# Patient Record
Sex: FEMALE | Race: ASIAN | NOT HISPANIC OR LATINO | URBAN - METROPOLITAN AREA
[De-identification: names, ages, dates, MRNs, and addresses within clinical notes are randomized per-mention and may not be internally consistent; named-entity substitution may affect disease eponyms.]

---

## 2022-05-15 ENCOUNTER — INPATIENT (INPATIENT)
Facility: HOSPITAL | Age: 52
LOS: 4 days | Discharge: HOME | DRG: 155 | End: 2022-05-20
Attending: INTERNAL MEDICINE | Admitting: INTERNAL MEDICINE
Payer: MEDICAID

## 2022-05-15 VITALS
SYSTOLIC BLOOD PRESSURE: 180 MMHG | HEIGHT: 66 IN | WEIGHT: 164.91 LBS | OXYGEN SATURATION: 100 % | RESPIRATION RATE: 18 BRPM | TEMPERATURE: 98 F | HEART RATE: 114 BPM | DIASTOLIC BLOOD PRESSURE: 90 MMHG

## 2022-05-15 DIAGNOSIS — K11.21 ACUTE SIALOADENITIS: ICD-10-CM

## 2022-05-15 LAB
ALBUMIN SERPL ELPH-MCNC: 5.2 G/DL — HIGH (ref 3.3–5)
ALP SERPL-CCNC: 93 U/L — SIGNIFICANT CHANGE UP (ref 40–120)
ALT FLD-CCNC: 47 U/L — HIGH (ref 10–45)
ANION GAP SERPL CALC-SCNC: 21 MMOL/L — HIGH (ref 5–17)
APPEARANCE UR: CLEAR — SIGNIFICANT CHANGE UP
APTT BLD: 27.6 SEC — SIGNIFICANT CHANGE UP (ref 27.5–35.5)
AST SERPL-CCNC: 36 U/L — SIGNIFICANT CHANGE UP (ref 10–40)
BACTERIA # UR AUTO: NEGATIVE — SIGNIFICANT CHANGE UP
BASE EXCESS BLDV CALC-SCNC: -2.3 MMOL/L — LOW (ref -2–2)
BASE EXCESS BLDV CALC-SCNC: -2.6 MMOL/L — LOW (ref -2–2)
BASOPHILS # BLD AUTO: 0.03 K/UL — SIGNIFICANT CHANGE UP (ref 0–0.2)
BASOPHILS NFR BLD AUTO: 0.3 % — SIGNIFICANT CHANGE UP (ref 0–2)
BILIRUB SERPL-MCNC: 0.8 MG/DL — SIGNIFICANT CHANGE UP (ref 0.2–1.2)
BILIRUB UR-MCNC: NEGATIVE — SIGNIFICANT CHANGE UP
BUN SERPL-MCNC: 13 MG/DL — SIGNIFICANT CHANGE UP (ref 7–23)
CA-I SERPL-SCNC: 1.18 MMOL/L — SIGNIFICANT CHANGE UP (ref 1.15–1.33)
CA-I SERPL-SCNC: 1.28 MMOL/L — SIGNIFICANT CHANGE UP (ref 1.15–1.33)
CALCIUM SERPL-MCNC: 10.2 MG/DL — SIGNIFICANT CHANGE UP (ref 8.4–10.5)
CHLORIDE BLDV-SCNC: 101 MMOL/L — SIGNIFICANT CHANGE UP (ref 96–108)
CHLORIDE BLDV-SCNC: 107 MMOL/L — SIGNIFICANT CHANGE UP (ref 96–108)
CHLORIDE SERPL-SCNC: 100 MMOL/L — SIGNIFICANT CHANGE UP (ref 96–108)
CO2 BLDV-SCNC: 24 MMOL/L — SIGNIFICANT CHANGE UP (ref 22–26)
CO2 BLDV-SCNC: 25 MMOL/L — SIGNIFICANT CHANGE UP (ref 22–26)
CO2 SERPL-SCNC: 18 MMOL/L — LOW (ref 22–31)
COLOR SPEC: COLORLESS — SIGNIFICANT CHANGE UP
CREAT SERPL-MCNC: 0.5 MG/DL — SIGNIFICANT CHANGE UP (ref 0.5–1.3)
CRP SERPL-MCNC: 6 MG/L — HIGH (ref 0–4)
DIFF PNL FLD: NEGATIVE — SIGNIFICANT CHANGE UP
EGFR: 113 ML/MIN/1.73M2 — SIGNIFICANT CHANGE UP
EOSINOPHIL # BLD AUTO: 0.08 K/UL — SIGNIFICANT CHANGE UP (ref 0–0.5)
EOSINOPHIL NFR BLD AUTO: 0.8 % — SIGNIFICANT CHANGE UP (ref 0–6)
EPI CELLS # UR: 3 /HPF — SIGNIFICANT CHANGE UP
GAS PNL BLDV: 139 MMOL/L — SIGNIFICANT CHANGE UP (ref 136–145)
GAS PNL BLDV: 140 MMOL/L — SIGNIFICANT CHANGE UP (ref 136–145)
GAS PNL BLDV: SIGNIFICANT CHANGE UP
GLUCOSE BLDC GLUCOMTR-MCNC: 268 MG/DL — HIGH (ref 70–99)
GLUCOSE BLDC GLUCOMTR-MCNC: 330 MG/DL — HIGH (ref 70–99)
GLUCOSE BLDV-MCNC: 109 MG/DL — HIGH (ref 70–99)
GLUCOSE BLDV-MCNC: 203 MG/DL — HIGH (ref 70–99)
GLUCOSE SERPL-MCNC: 202 MG/DL — HIGH (ref 70–99)
GLUCOSE UR QL: NEGATIVE — SIGNIFICANT CHANGE UP
HCO3 BLDV-SCNC: 23 MMOL/L — SIGNIFICANT CHANGE UP (ref 22–29)
HCO3 BLDV-SCNC: 24 MMOL/L — SIGNIFICANT CHANGE UP (ref 22–29)
HCT VFR BLD CALC: 39.1 % — SIGNIFICANT CHANGE UP (ref 34.5–45)
HCT VFR BLDA CALC: 33 % — LOW (ref 34.5–46.5)
HCT VFR BLDA CALC: 40 % — SIGNIFICANT CHANGE UP (ref 34.5–46.5)
HGB BLD CALC-MCNC: 10.9 G/DL — LOW (ref 11.7–16.1)
HGB BLD CALC-MCNC: 13.3 G/DL — SIGNIFICANT CHANGE UP (ref 11.7–16.1)
HGB BLD-MCNC: 12.5 G/DL — SIGNIFICANT CHANGE UP (ref 11.5–15.5)
HYALINE CASTS # UR AUTO: 0 /LPF — SIGNIFICANT CHANGE UP (ref 0–2)
IMM GRANULOCYTES NFR BLD AUTO: 0.7 % — SIGNIFICANT CHANGE UP (ref 0–1.5)
INR BLD: 1.03 RATIO — SIGNIFICANT CHANGE UP (ref 0.88–1.16)
KETONES UR-MCNC: NEGATIVE — SIGNIFICANT CHANGE UP
LACTATE BLDV-MCNC: 2.7 MMOL/L — HIGH (ref 0.7–2)
LACTATE BLDV-MCNC: 6.4 MMOL/L — CRITICAL HIGH (ref 0.7–2)
LEUKOCYTE ESTERASE UR-ACNC: ABNORMAL
LYMPHOCYTES # BLD AUTO: 2.58 K/UL — SIGNIFICANT CHANGE UP (ref 1–3.3)
LYMPHOCYTES # BLD AUTO: 27.1 % — SIGNIFICANT CHANGE UP (ref 13–44)
MCHC RBC-ENTMCNC: 29.8 PG — SIGNIFICANT CHANGE UP (ref 27–34)
MCHC RBC-ENTMCNC: 32 GM/DL — SIGNIFICANT CHANGE UP (ref 32–36)
MCV RBC AUTO: 93.1 FL — SIGNIFICANT CHANGE UP (ref 80–100)
MONOCYTES # BLD AUTO: 0.44 K/UL — SIGNIFICANT CHANGE UP (ref 0–0.9)
MONOCYTES NFR BLD AUTO: 4.6 % — SIGNIFICANT CHANGE UP (ref 2–14)
MRSA PCR RESULT.: SIGNIFICANT CHANGE UP
MUV AB SER-ACNC: POSITIVE
MUV IGG FLD-ACNC: 56.9 AU/ML — SIGNIFICANT CHANGE UP
NEUTROPHILS # BLD AUTO: 6.32 K/UL — SIGNIFICANT CHANGE UP (ref 1.8–7.4)
NEUTROPHILS NFR BLD AUTO: 66.5 % — SIGNIFICANT CHANGE UP (ref 43–77)
NITRITE UR-MCNC: NEGATIVE — SIGNIFICANT CHANGE UP
NRBC # BLD: 0 /100 WBCS — SIGNIFICANT CHANGE UP (ref 0–0)
PCO2 BLDV: 43 MMHG — HIGH (ref 39–42)
PCO2 BLDV: 45 MMHG — HIGH (ref 39–42)
PH BLDV: 7.33 — SIGNIFICANT CHANGE UP (ref 7.32–7.43)
PH BLDV: 7.34 — SIGNIFICANT CHANGE UP (ref 7.32–7.43)
PH UR: 6 — SIGNIFICANT CHANGE UP (ref 5–8)
PLATELET # BLD AUTO: 159 K/UL — SIGNIFICANT CHANGE UP (ref 150–400)
PO2 BLDV: 48 MMHG — HIGH (ref 25–45)
PO2 BLDV: 63 MMHG — HIGH (ref 25–45)
POTASSIUM BLDV-SCNC: 3.7 MMOL/L — SIGNIFICANT CHANGE UP (ref 3.5–5.1)
POTASSIUM BLDV-SCNC: 4.1 MMOL/L — SIGNIFICANT CHANGE UP (ref 3.5–5.1)
POTASSIUM SERPL-MCNC: 3.9 MMOL/L — SIGNIFICANT CHANGE UP (ref 3.5–5.3)
POTASSIUM SERPL-SCNC: 3.9 MMOL/L — SIGNIFICANT CHANGE UP (ref 3.5–5.3)
PROCALCITONIN SERPL-MCNC: 0.06 NG/ML — SIGNIFICANT CHANGE UP (ref 0.02–0.1)
PROT SERPL-MCNC: 8.4 G/DL — HIGH (ref 6–8.3)
PROT UR-MCNC: NEGATIVE — SIGNIFICANT CHANGE UP
PROTHROM AB SERPL-ACNC: 11.9 SEC — SIGNIFICANT CHANGE UP (ref 10.5–13.4)
RBC # BLD: 4.2 M/UL — SIGNIFICANT CHANGE UP (ref 3.8–5.2)
RBC # FLD: 13.2 % — SIGNIFICANT CHANGE UP (ref 10.3–14.5)
RBC CASTS # UR COMP ASSIST: 1 /HPF — SIGNIFICANT CHANGE UP (ref 0–4)
S AUREUS DNA NOSE QL NAA+PROBE: SIGNIFICANT CHANGE UP
SAO2 % BLDV: 80.2 % — SIGNIFICANT CHANGE UP (ref 67–88)
SAO2 % BLDV: 91.6 % — HIGH (ref 67–88)
SARS-COV-2 RNA SPEC QL NAA+PROBE: SIGNIFICANT CHANGE UP
SODIUM SERPL-SCNC: 139 MMOL/L — SIGNIFICANT CHANGE UP (ref 135–145)
SP GR SPEC: 1.04 — HIGH (ref 1.01–1.02)
UROBILINOGEN FLD QL: NEGATIVE — SIGNIFICANT CHANGE UP
WBC # BLD: 9.52 K/UL — SIGNIFICANT CHANGE UP (ref 3.8–10.5)
WBC # FLD AUTO: 9.52 K/UL — SIGNIFICANT CHANGE UP (ref 3.8–10.5)
WBC UR QL: 14 /HPF — HIGH (ref 0–5)

## 2022-05-15 PROCEDURE — 71045 X-RAY EXAM CHEST 1 VIEW: CPT | Mod: 26

## 2022-05-15 PROCEDURE — 70491 CT SOFT TISSUE NECK W/DYE: CPT | Mod: 26,MA

## 2022-05-15 PROCEDURE — 99291 CRITICAL CARE FIRST HOUR: CPT

## 2022-05-15 RX ORDER — SODIUM CHLORIDE 9 MG/ML
1000 INJECTION, SOLUTION INTRAVENOUS
Refills: 0 | Status: DISCONTINUED | OUTPATIENT
Start: 2022-05-15 | End: 2022-05-20

## 2022-05-15 RX ORDER — ACETAMINOPHEN 500 MG
1000 TABLET ORAL EVERY 6 HOURS
Refills: 0 | Status: DISCONTINUED | OUTPATIENT
Start: 2022-05-15 | End: 2022-05-20

## 2022-05-15 RX ORDER — BENZOCAINE AND MENTHOL 5; 1 G/100ML; G/100ML
1 LIQUID ORAL
Refills: 0 | Status: DISCONTINUED | OUTPATIENT
Start: 2022-05-15 | End: 2022-05-20

## 2022-05-15 RX ORDER — DEXTROSE 50 % IN WATER 50 %
25 SYRINGE (ML) INTRAVENOUS ONCE
Refills: 0 | Status: DISCONTINUED | OUTPATIENT
Start: 2022-05-15 | End: 2022-05-20

## 2022-05-15 RX ORDER — INSULIN LISPRO 100/ML
VIAL (ML) SUBCUTANEOUS
Refills: 0 | Status: DISCONTINUED | OUTPATIENT
Start: 2022-05-15 | End: 2022-05-20

## 2022-05-15 RX ORDER — SODIUM CHLORIDE 9 MG/ML
1000 INJECTION INTRAMUSCULAR; INTRAVENOUS; SUBCUTANEOUS ONCE
Refills: 0 | Status: DISCONTINUED | OUTPATIENT
Start: 2022-05-15 | End: 2022-05-15

## 2022-05-15 RX ORDER — GLUCAGON INJECTION, SOLUTION 0.5 MG/.1ML
1 INJECTION, SOLUTION SUBCUTANEOUS ONCE
Refills: 0 | Status: DISCONTINUED | OUTPATIENT
Start: 2022-05-15 | End: 2022-05-20

## 2022-05-15 RX ORDER — AMPICILLIN SODIUM AND SULBACTAM SODIUM 250; 125 MG/ML; MG/ML
3 INJECTION, POWDER, FOR SUSPENSION INTRAMUSCULAR; INTRAVENOUS EVERY 6 HOURS
Refills: 0 | Status: DISCONTINUED | OUTPATIENT
Start: 2022-05-15 | End: 2022-05-17

## 2022-05-15 RX ORDER — SODIUM CHLORIDE 9 MG/ML
1000 INJECTION INTRAMUSCULAR; INTRAVENOUS; SUBCUTANEOUS ONCE
Refills: 0 | Status: COMPLETED | OUTPATIENT
Start: 2022-05-15 | End: 2022-05-15

## 2022-05-15 RX ORDER — IBUPROFEN 200 MG
600 TABLET ORAL EVERY 6 HOURS
Refills: 0 | Status: DISCONTINUED | OUTPATIENT
Start: 2022-05-15 | End: 2022-05-20

## 2022-05-15 RX ORDER — PIPERACILLIN AND TAZOBACTAM 4; .5 G/20ML; G/20ML
3.38 INJECTION, POWDER, LYOPHILIZED, FOR SOLUTION INTRAVENOUS ONCE
Refills: 0 | Status: COMPLETED | OUTPATIENT
Start: 2022-05-15 | End: 2022-05-15

## 2022-05-15 RX ORDER — ACETAMINOPHEN 500 MG
650 TABLET ORAL ONCE
Refills: 0 | Status: COMPLETED | OUTPATIENT
Start: 2022-05-15 | End: 2022-05-15

## 2022-05-15 RX ORDER — DEXAMETHASONE 0.5 MG/5ML
8 ELIXIR ORAL EVERY 8 HOURS
Refills: 0 | Status: DISCONTINUED | OUTPATIENT
Start: 2022-05-15 | End: 2022-05-17

## 2022-05-15 RX ORDER — DEXTROSE 50 % IN WATER 50 %
15 SYRINGE (ML) INTRAVENOUS ONCE
Refills: 0 | Status: DISCONTINUED | OUTPATIENT
Start: 2022-05-15 | End: 2022-05-20

## 2022-05-15 RX ORDER — INSULIN LISPRO 100/ML
VIAL (ML) SUBCUTANEOUS AT BEDTIME
Refills: 0 | Status: DISCONTINUED | OUTPATIENT
Start: 2022-05-15 | End: 2022-05-20

## 2022-05-15 RX ORDER — DEXTROSE 50 % IN WATER 50 %
12.5 SYRINGE (ML) INTRAVENOUS ONCE
Refills: 0 | Status: DISCONTINUED | OUTPATIENT
Start: 2022-05-15 | End: 2022-05-20

## 2022-05-15 RX ORDER — DEXAMETHASONE 0.5 MG/5ML
6 ELIXIR ORAL EVERY 8 HOURS
Refills: 0 | Status: DISCONTINUED | OUTPATIENT
Start: 2022-05-15 | End: 2022-05-15

## 2022-05-15 RX ORDER — SODIUM CHLORIDE 9 MG/ML
2000 INJECTION INTRAMUSCULAR; INTRAVENOUS; SUBCUTANEOUS ONCE
Refills: 0 | Status: COMPLETED | OUTPATIENT
Start: 2022-05-15 | End: 2022-05-15

## 2022-05-15 RX ADMIN — AMPICILLIN SODIUM AND SULBACTAM SODIUM 200 GRAM(S): 250; 125 INJECTION, POWDER, FOR SUSPENSION INTRAMUSCULAR; INTRAVENOUS at 22:01

## 2022-05-15 RX ADMIN — SODIUM CHLORIDE 2000 MILLILITER(S): 9 INJECTION INTRAMUSCULAR; INTRAVENOUS; SUBCUTANEOUS at 07:57

## 2022-05-15 RX ADMIN — Medication 3: at 20:21

## 2022-05-15 RX ADMIN — SODIUM CHLORIDE 1000 MILLILITER(S): 9 INJECTION INTRAMUSCULAR; INTRAVENOUS; SUBCUTANEOUS at 22:51

## 2022-05-15 RX ADMIN — PIPERACILLIN AND TAZOBACTAM 200 GRAM(S): 4; .5 INJECTION, POWDER, LYOPHILIZED, FOR SOLUTION INTRAVENOUS at 09:01

## 2022-05-15 RX ADMIN — Medication 2: at 22:51

## 2022-05-15 RX ADMIN — Medication 101.6 MILLIGRAM(S): at 21:25

## 2022-05-15 RX ADMIN — Medication 6 MILLIGRAM(S): at 13:24

## 2022-05-15 RX ADMIN — Medication 650 MILLIGRAM(S): at 07:58

## 2022-05-15 RX ADMIN — AMPICILLIN SODIUM AND SULBACTAM SODIUM 200 GRAM(S): 250; 125 INJECTION, POWDER, FOR SUSPENSION INTRAMUSCULAR; INTRAVENOUS at 16:30

## 2022-05-15 NOTE — ED CDU PROVIDER INITIAL DAY NOTE - DETAILS
Sialosis / Parotiditis  - IV Unasyn (confirmed w/ ENT)  - IV Decadron 8mg q8  - Pain ctrl  - ENT Following

## 2022-05-15 NOTE — ED ADULT NURSE REASSESSMENT NOTE - NS ED NURSE REASSESS COMMENT FT1
MD Sanz aware that patient is febrile. [facility]  used, ID # [id number] Trent. Patient updated on plan of care. Breathing spontaneous and unlabored on room air. Skin warm hot and of color appropriate for ethnicity.  at bedside. Comfort and safety measures in place.

## 2022-05-15 NOTE — ED CDU PROVIDER INITIAL DAY NOTE - NS ED ATTENDING STATEMENT MOD
This was a shared visit with the BLAIR. I reviewed and verified the documentation and independently performed the documented:

## 2022-05-15 NOTE — CONSULT NOTE ADULT - SUBJECTIVE AND OBJECTIVE BOX
CC: b/l neck swelling     HPI:  53 yo F pmhx htn dm hypothyroid p/w 3 months of b/l facial swelling. Started 3 months ago no inciting incident or trauma. intermittent worsening swelling and pain. seen by pcp given course of amoxacillin for 1 month with no improvement. Pt is able to tolerate po intake without any dysphagia, odynophagia. intermittent fevers with fatigue. no weight loss or night sweats. no sick contacts. Denies CP sob abd pain n/v dysuria.    PAST MEDICAL & SURGICAL HISTORY:    Allergies    No Known Allergies    Intolerances      MEDICATIONS  (STANDING):  dexAMETHasone     Tablet 6 milliGRAM(s) Oral every 8 hours    MEDICATIONS  (PRN):      Social History: no tobacco, no etoh     Family history: Pt denies any sign FHx    ROS:   ENT: all negative except as noted in HPI   CV: denies palpitations  Pulm: denies SOB, cough, hemoptysis  GI: denies change in apetite, indigestion, n/v  : denies pertinent urinary symptoms, urgency  Neuro: denies numbness/tingling, loss of sensation  Psych: denies anxiety  MS: denies muscle weakness, instability  Heme: denies easy bruising or bleeding  Endo: denies heat/cold intolerance, excessive sweating  Vascular: denies LE edema    Vital Signs Last 24 Hrs  T(C): 36.7 (15 May 2022 13:19), Max: 38.2 (15 May 2022 07:40)  T(F): 98.1 (15 May 2022 13:19), Max: 100.8 (15 May 2022 07:40)  HR: 84 (15 May 2022 13:19) (84 - 114)  BP: 111/81 (15 May 2022 13:19) (111/81 - 180/90)  BP(mean): --  RR: 18 (15 May 2022 13:19) (18 - 20)  SpO2: 97% (15 May 2022 13:19) (96% - 100%)                          12.5   9.52  )-----------( 159      ( 15 May 2022 08:07 )             39.1    05-15    139  |  100  |  13  ----------------------------<  202<H>  3.9   |  18<L>  |  0.50    Ca    10.2      15 May 2022 08:07    TPro  8.4<H>  /  Alb  5.2<H>  /  TBili  0.8  /  DBili  x   /  AST  36  /  ALT  47<H>  /  AlkPhos  93  05-15   PT/INR - ( 15 May 2022 08:07 )   PT: 11.9 sec;   INR: 1.03 ratio         PTT - ( 15 May 2022 08:07 )  PTT:27.6 sec    PHYSICAL EXAM:  Gen: NAD  Skin: No rashes, bruises, or lesions  Head: Normocephalic, Atraumatic  Face: no edema, erythema, or fluctuance. Parotid glands soft without mass  Eyes: no scleral injection  Nose: Nares bilaterally patent, no discharge  Mouth: No Stridor / Drooling / Trismus.  Mucosa moist, tongue/uvula midline, oropharynx clear  Neck: b/l parotid swelling, b/l submandibular swelling +TTP L<R, supple,  trachea midline  Lymphatic: + lymphadenopathy  Resp: breathing easily, no stridor  CV: no peripheral edema/cyanosis  GI: nondistended   Peripheral vascular: no JVD or edema  Neuro: facial nerve intact, no facial droop              IMAGING/ADDITIONAL STUDIES: < from: CT Neck Soft Tissue w/ IV Cont (05.15.22 @ 09:32) >  IMPRESSION:  Symmetric enlargement of the BILATERAL parotid glands which fatty   infiltration consistent with sialosis    < end of copied text >

## 2022-05-15 NOTE — ED CDU PROVIDER INITIAL DAY NOTE - PROGRESS NOTE DETAILS
CDU PROGRESS NOTE AMANDA SANTIAGO: Krupa  #[id number] Romero. Received pt at 1900 sign-out. Pt resting in stretcher in NAD. VSS. Pt ate dinner. Ambulatory around unit with steady gait. S1 S2 noted, RRR, lungs CTA b/l, BS x4 with soft, nontender abdomen. Pt without complaints. Will continue to monitor overnight. CDU PROGRESS NOTE AMANDA SANTIAGO: Krupa  #[id number] Romero. Received pt at 1900 sign-out. Pt resting in stretcher in NAD. VSS. Pt is aox3, speaking coherently, No Stridor / Drooling / Trismus.  Mucosa moist, tongue/uvula midline, oropharynx clear Neck: b/l parotid swelling, b/l submandibular swelling +TTP L<R, supple,  trachea midline. Lymphatic: + lymphadenopathy. Pt without complaints. Will continue to monitor overnight. ENT recs appreciated.

## 2022-05-15 NOTE — ED ADULT NURSE REASSESSMENT NOTE - NS ED NURSE REASSESS COMMENT FT1
"Oncology Rooming Note    December 21, 2017 9:53 AM   Ashvin Tiwari is a 64 year old female who presents for:    Chief Complaint   Patient presents with     Port Draw     port accessed and labs drawn by rn.  vs taken.     Oncology Clinic Visit     Return for Neoplasm of Cervix     Initial Vitals: /88 (BP Location: Left arm, Patient Position: Sitting, Cuff Size: Adult Large)  Pulse 110  Temp 99  F (37.2  C) (Oral)  Resp 16  Wt 80.7 kg (177 lb 14.4 oz)  SpO2 96%  BMI 31.51 kg/m2 Estimated body mass index is 31.51 kg/(m^2) as calculated from the following:    Height as of 12/19/17: 1.6 m (5' 3\").    Weight as of this encounter: 80.7 kg (177 lb 14.4 oz). Body surface area is 1.89 meters squared.  Severe Pain (7) Comment: right   No LMP recorded. Patient is postmenopausal.  Allergies reviewed: Yes  Medications reviewed: Yes    Medications: Medication refills not needed today.  Pharmacy name entered into EPIC:    [facility] (USE ONLY AT [facility]) - [place], [place] - [address] [id number] - [place], [place] - [address] [facility] [address] [facility]    Clinical concerns: results, BP still high, per patient   Ruth was notified.    6 minutes for nursing intake (face to face time)     Monica Madrigal MA              " PER NURSING PT AWAITING BED AT [facility] FOR ERCP WITH POSSIBLE DRAIN. CM TO SEE IF PLAN CHANGES. Pt received from MICA Price. Pt oriented to CDU & plan of care was discussed. Pt denies any pain and difficulty breathing or swallowing. B/l neck swelling noted. Pt speaking in full coherent sentences. Pt able to tolerate dinner without any difficulty. Pt aware to notify RN or PA for any difficulty breathing or swallowing. Safety & comfort measures maintained. Call bell in reach. Will continue to monitor.

## 2022-05-15 NOTE — ED CDU PROVIDER DISPOSITION NOTE - CLINICAL COURSE
French  Romero [id number] (Pt accompanied by )  	51 yo F pmhx htn dm hypothyroid p/w 3 months of b/l facial swelling. Started 3 months ago no inciting incident or trauma. intermittent worsening swelling and pain. seen by pcp given course of amoxacillin for 1 month with no improvement. Pt is able to tolerate po intake. intermittent fevers with fatigue. no weight loss or night sweats. no sob or difficulty swallowing. no sick contacts. Denies CP abd pain n/v dysuria.  In ED, initial lactate 6.4 improved to 2.7 after 2L fluid bolus, labs otherwise non-actionable. HR normalized, VSS. CT neck shows sialosis. +Entero/rhinovirus. ENT consulted, Mumps r/o, rec IV decadron and Unasyn for now. CDU for steroids, abx, pain ctrl, ENT following.  In CDU, Sami  Olesyarodsima [id number] (Pt accompanied by )  	53 yo F pmhx htn dm hypothyroid p/w 3 months of b/l facial swelling. Started 3 months ago no inciting incident or trauma. intermittent worsening swelling and pain. seen by pcp given course of amoxacillin for 1 month with no improvement. Pt is able to tolerate po intake. intermittent fevers with fatigue. no weight loss or night sweats. no sob or difficulty swallowing. no sick contacts. Denies CP abd pain n/v dysuria.  In ED, initial lactate 6.4 improved to 2.7 after 2L fluid bolus, labs otherwise non-actionable. HR normalized, VSS. CT neck shows sialosis. +Entero/rhinovirus. ENT consulted, Mumps r/o, rec IV decadron and Unasyn for now. CDU for steroids, abx, pain ctrl, ENT following.  In CDU, pt with improvement in pain however lactate not improving with multiple fluid boluses, UA with +WBC and LE, treated for uti and admitted to medicine, d/w Dr. Lujan.

## 2022-05-15 NOTE — ED ADULT NURSE NOTE - OBJECTIVE STATEMENT
52 year old female with a PMH DM, hyperthyroidism, HTN and GERD comes to the ED c/o b/l parotid gland swelling x3 months, increasing in pain. Pt endorses able to swallow/control secretions, speaking in full coherent sentences and no drooling noted. Pt was placed on amoxicillin with minimal improvement. Pt pending ENT consult, has only been seen by primary MD. Pt is a/ox4, VSS, sensory/motor function intact, follows commands and in NAD at this time. Lung sounds are clear and equal b/l with no labored breathing noted. Abdomen is soft, nontender and nondistended. Peripheral pulses are strong and equal b/l with no edema noted. Skin is warm, dry and intact. Pt denies CP/SOB, f/c, n/v/d, dizziness/lightheadedness, numbness/tingling/weakness, abdominal pain, back pain, hematuria/dysuria/frequency at this time. 52 year old female with a PMH DM, hyperthyroidism, HTN and GERD, primarily Lithuanian speaking,  Abdirahman ID#[id number] used, comes to the ED c/o b/l parotid gland swelling x3 months, increasing in pain. Pt endorses able to swallow/control secretions, speaking in full coherent sentences and no drooling noted. Pt was placed on amoxicillin with minimal improvement. Pt pending ENT consult, has only been seen by primary MD. Pt is a/ox4, VSS, sensory/motor function intact, follows commands and in NAD at this time. Lung sounds are clear and equal b/l with no labored breathing noted. Abdomen is soft, nontender and nondistended. Peripheral pulses are strong and equal b/l with no edema noted. Skin is warm, dry and intact. Pt denies CP/SOB, f/c, n/v/d, dizziness/lightheadedness, numbness/tingling/weakness, abdominal pain, back pain, hematuria/dysuria/frequency at this time.

## 2022-05-15 NOTE — ED PROVIDER NOTE - OBJECTIVE STATEMENT
51 yo F pmhx htn dm hypothyroid p/w 3 months of b/l facial swelling. Started 3 months ago no inciting incident or trauma. intermittent worsening swelling and pain. seen by pcp given course of amoxacillin for 1 month with no improvement. Pt is able to tolerate po intake. intermittent fevers with fatigue. no weight loss or night sweats. no sick contacts. Denies CP sob abd pain n/v dysuria.

## 2022-05-15 NOTE — ED PROVIDER NOTE - ATTENDING CONTRIBUTION TO CARE
53yr F Tongan (Tamazight speaking,  # [id number]) w 3 months of parotid gland swelling, and intermittent fevers. completed a course of antibiotics. hx of DM on oral meds. reports no cough, sorethroat, headache, rhinorrhea, no dysphagia or dyspnea, no cp, abd pain, GI change or urinary sx.  non smoker, non drinker.   exam notable for well appearing, slightly diaphoretic, tachycardic. neuro exam normal in detail, clear lungs, S1-2, soft non tender abd, no peripheral edema. clear oropharynx, bilateral swelling at the angle of mandible, mild tenderness to palpation, no redness, has symmetric non tender LAP in cervical area and submental.   labs notable for no leukocytosis, nl procal, CRP slightly elevated. CT shows sialosis however given fever and no identifiable source will consult ENT 53yr F Kazakh (Mongolian speaking,  # [id number]) w 3 months of parotid gland swelling, and intermittent fevers. completed a course of antibiotics. hx of DM on oral meds. reports no cough, sorethroat, headache, rhinorrhea, no dysphagia or dyspnea, no cp, abd pain, GI change or urinary sx.  non smoker, non drinker.   exam notable for well appearing, slightly diaphoretic, tachycardic. neuro exam normal in detail, clear lungs, S1-2, soft non tender abd, no peripheral edema. clear oropharynx, bilateral swelling at the angle of mandible, mild tenderness to palpation, no redness, has symmetric non tender LAP in cervical area and submental. empiric zosyn started given febrile. concern for sepsis.   labs notable for no leukocytosis, nl procal, CRP slightly elevated. CT shows sialosis however given fever and no identifiable source will consult ENT

## 2022-05-15 NOTE — CONSULT NOTE ADULT - PROBLEM SELECTOR RECOMMENDATION 9
- Pt discussed in detail with Dr. Coyle, plan for CDU overnight  - abx   - decadron 8q8 x3 doses  - mumps work up   - sialogogues.  - will follow up in am

## 2022-05-15 NOTE — ED CDU PROVIDER INITIAL DAY NOTE - MEDICAL DECISION MAKING DETAILS
52 yr F hx of DM not on insulin w 3 months of bilat parotid swelling, s/p course of antibiotics p/w low grade temp and malaise and persistent mass over parotids. empiric antibiotics started and CT diagnosed with sialosis. ENT seen and recommended addition of steroids and will follow up the patient.

## 2022-05-15 NOTE — ED CDU PROVIDER DISPOSITION NOTE - ATTENDING CONTRIBUTION TO CARE
51 y/o f with pmhx Romanian speaking (language line [id number]) DM type 2, hypothyroidism (on levothyroxin) presented tot the ED for 3 months of facial swelling. Initial evaluation in the Emergency Department noted to have elevated lactate and was treated with abx and IVS with improvement. transferred to the CDU for monitoring and ENT eval. Of note, patient had similar episode approx 5 years ago and attributed the event to exposure to something cold. denies fever today.+ for entero virus and rhinovirus. no vaccination for MMR. Denies any other symptoms. no cough no abd pain no chest pain.   Gen.  well appearing female  HEENT:  mild b/l edema of parotid edema, uvula midline, patent airway. no trismus or drooling.   Lungs:  b/l bs  CVS: S1S2   Abd;  soft no tender no distention no guarding  Ext: no edema no erythema  Neuro:aaox3  MSK: moving all ext spon 53 y/o f with pmhx Italian speaking (language line [id number]) DM type 2, hypothyroidism (on levothyroxin) presented tot the ED for 3 months of facial swelling. Initial evaluation in the Emergency Department noted to have elevated lactate and was treated with abx and IVS with improvement. transferred to the CDU for monitoring and ENT eval. Of note, patient had similar episode approx 5 years ago and attributed the event to exposure to something cold. denies fever today.+ for entero virus and rhinovirus. no vaccination for MMR. Denies any other symptoms. no cough no abd pain no chest pain.   Gen.  well appearing female  HEENT:  mild b/l edema of parotid edema, uvula midline, patent airway. no trismus or drooling.   Lungs:  b/l bs  CVS: S1S2   Abd;  soft no tender no distention no guarding  Ext: no edema no erythema  Neuro:aaox3  MSK: moving all ext spon

## 2022-05-15 NOTE — ED CDU PROVIDER INITIAL DAY NOTE - SEVERE SEPSIS CRITERIA MET YN (MLM)
Sepsis Criteria were met: Render Risk Assessment In Note?: no Note Text (......Xxx Chief Complaint.): This diagnosis correlates with the Other (Free Text): Discussed Isotretinoin treatment in length and detail \\n\\nSide effects explained\\n\\nStanding Lab orders given to pt and mom\\n\\nIsotretinoin information and consent forms given to pt Detail Level: Zone

## 2022-05-15 NOTE — ED CDU PROVIDER DISPOSITION NOTE - NSFOLLOWUPINSTRUCTIONS_ED_ALL_ED_FT
1) Follow-up with your primary care provider in 1-2 days.      Follow-up with ENT within 1-2 weeks. Call for appointment.    [facility] - ENT  Otolaryngology (ENT)  [address] [place] [address]  Phone: [phone]    2) Continue to take all medications as prescribed.    3) Rest and drink plenty of water. Pain can be managed with Acetaminophen (aka Tylenol) and Ibuprofen (aka Motrin or Advil) over the counter as directed. Take with food.    4) Return to the ER for any new or worsening symptoms.
none

## 2022-05-15 NOTE — ED CDU PROVIDER INITIAL DAY NOTE - NSICDXPASTMEDICALHX_GEN_ALL_CORE_FT
PAST MEDICAL HISTORY:  DM (diabetes mellitus)     GERD (gastroesophageal reflux disease)     HTN (hypertension)     Hypothyroidism

## 2022-05-15 NOTE — ED ADULT NURSE REASSESSMENT NOTE - NS ED NURSE REASSESS COMMENT FT1
[id number] Gunnar  [id number] Gunnar. Patient updated on plan of care. Breathing spontaneous and unlabored on room air. Skin warm dry and of color appropriate for ethnicity. Comfort and safety measures in place. Pending CDU evaluation.

## 2022-05-15 NOTE — ED CDU PROVIDER INITIAL DAY NOTE - OBJECTIVE STATEMENT
Krupa  Romero [id number] (Pt accompanied by )  53 yo F pmhx htn dm hypothyroid p/w 3 months of b/l facial swelling. Started 3 months ago no inciting incident or trauma. intermittent worsening swelling and pain. seen by pcp given course of amoxacillin for 1 month with no improvement. Pt is able to tolerate po intake. intermittent fevers with fatigue. no weight loss or night sweats. no sick contacts. Denies CP sob abd pain n/v dysuria.  In ED, initial lactate 6.4 improved to 2.7 after 2L fluid bolus, labs otherwise non-actionable. HR normalized, VSS. CT neck shows sialosis. +Entero/rhinovirus. ENT consulted, Mumps r/o, rec IV decadron and Unasyn for now. CDU for steroids, abx, pain ctrl, ENT following. [facility]  Romero [id number] (Pt accompanied by )  53 yo F pmhx htn dm hypothyroid p/w 3 months of b/l facial swelling. Started 3 months ago no inciting incident or trauma. intermittent worsening swelling and pain. seen by pcp given course of amoxacillin for 1 month with no improvement. Pt is able to tolerate po intake. intermittent fevers with fatigue. no weight loss or night sweats. no sob or difficulty swallowing. no sick contacts. Denies CP abd pain n/v dysuria.  In ED, initial lactate 6.4 improved to 2.7 after 2L fluid bolus, labs otherwise non-actionable. HR normalized, VSS. CT neck shows sialosis. +Entero/rhinovirus. ENT consulted, Mumps r/o, rec IV decadron and Unasyn for now. CDU for steroids, abx, pain ctrl, ENT following.

## 2022-05-15 NOTE — ED PROVIDER NOTE - PHYSICAL EXAMINATION
Gen: NAD, non-toxic appearing  Head: normal appearing  HEENT: b/l parotid gland swelling no erythema no warmth. poor dentition.   Lung: no respiratory distress, speaking in full sentences, CTA b/l     CV: regular rate and rhythm, no murmurs  Abd: soft, non distended, non tender   MSK: no visible deformities  Neuro: No focal deficits, AAOx3  Skin: Warm  Psych: normal affect

## 2022-05-15 NOTE — ED CDU PROVIDER INITIAL DAY NOTE - PHYSICAL EXAMINATION
GEN: Pt non-toxic in NAD, alert, answering questions appropriately.  PSYCH: Affect and mood appropriate.  EYES: Sclera white w/o injection, EOMI.  ENT: +B/L parotid swelling, +ttp, no erythema or warmth. Neck supple FROM. Airway patent. No pharyngeal erythema or edema. No supraclavicular nodes.  RESP: CTA b/l, no wheezes, rales, or rhonchi.  CARDIAC: RRR, clear distinct S1, S2, no appreciable murmurs.  ABD: Abdomen soft, non-tender.  MSK: Moving all extremities.  NEURO: No focal motor or sensory deficits.  VASC: Radial pulses 2+ b/l. No edema.  SKIN: No rashes or lesions.

## 2022-05-15 NOTE — ED PROVIDER NOTE - CLINICAL SUMMARY MEDICAL DECISION MAKING FREE TEXT BOX
53 yo F p/w 3 months of b/l parotid gland swelling intermittent fevers. non responsive to 1 month course of abx. febrile tachycardic. c/f sepsis 2/2 parotid gland infection viral vs bacterial vs malignancy. Will r.o other infectious sources. Plan for labs ua cxr blood and urine cx. CT max face and neck. will give antipyretic ivf and r/a

## 2022-05-16 DIAGNOSIS — E11.9 TYPE 2 DIABETES MELLITUS WITHOUT COMPLICATIONS: ICD-10-CM

## 2022-05-16 DIAGNOSIS — R60.9 EDEMA, UNSPECIFIED: ICD-10-CM

## 2022-05-16 DIAGNOSIS — I10 ESSENTIAL (PRIMARY) HYPERTENSION: ICD-10-CM

## 2022-05-16 DIAGNOSIS — E03.9 HYPOTHYROIDISM, UNSPECIFIED: ICD-10-CM

## 2022-05-16 LAB
CULTURE RESULTS: SIGNIFICANT CHANGE UP
GAS PNL BLDV: SIGNIFICANT CHANGE UP
GAS PNL BLDV: SIGNIFICANT CHANGE UP
GLUCOSE BLDC GLUCOMTR-MCNC: 191 MG/DL — HIGH (ref 70–99)
GLUCOSE BLDC GLUCOMTR-MCNC: 220 MG/DL — HIGH (ref 70–99)
GLUCOSE BLDC GLUCOMTR-MCNC: 256 MG/DL — HIGH (ref 70–99)
GLUCOSE BLDC GLUCOMTR-MCNC: 321 MG/DL — HIGH (ref 70–99)
SPECIMEN SOURCE: SIGNIFICANT CHANGE UP

## 2022-05-16 PROCEDURE — 99217: CPT

## 2022-05-16 RX ORDER — CEFTRIAXONE 500 MG/1
1000 INJECTION, POWDER, FOR SOLUTION INTRAMUSCULAR; INTRAVENOUS ONCE
Refills: 0 | Status: COMPLETED | OUTPATIENT
Start: 2022-05-16 | End: 2022-05-16

## 2022-05-16 RX ORDER — ACETAZOLAMIDE 250 MG/1
250 TABLET ORAL
Refills: 0 | Status: DISCONTINUED | OUTPATIENT
Start: 2022-05-16 | End: 2022-05-16

## 2022-05-16 RX ORDER — VALSARTAN 80 MG/1
160 TABLET ORAL DAILY
Refills: 0 | Status: DISCONTINUED | OUTPATIENT
Start: 2022-05-16 | End: 2022-05-20

## 2022-05-16 RX ORDER — SIMVASTATIN 20 MG/1
20 TABLET, FILM COATED ORAL AT BEDTIME
Refills: 0 | Status: DISCONTINUED | OUTPATIENT
Start: 2022-05-16 | End: 2022-05-20

## 2022-05-16 RX ORDER — LEVOTHYROXINE SODIUM 125 MCG
25 TABLET ORAL DAILY
Refills: 0 | Status: DISCONTINUED | OUTPATIENT
Start: 2022-05-16 | End: 2022-05-20

## 2022-05-16 RX ORDER — FOLIC ACID 0.8 MG
1 TABLET ORAL DAILY
Refills: 0 | Status: DISCONTINUED | OUTPATIENT
Start: 2022-05-16 | End: 2022-05-20

## 2022-05-16 RX ORDER — SODIUM CHLORIDE 9 MG/ML
1000 INJECTION INTRAMUSCULAR; INTRAVENOUS; SUBCUTANEOUS ONCE
Refills: 0 | Status: COMPLETED | OUTPATIENT
Start: 2022-05-16 | End: 2022-05-16

## 2022-05-16 RX ADMIN — AMPICILLIN SODIUM AND SULBACTAM SODIUM 200 GRAM(S): 250; 125 INJECTION, POWDER, FOR SUSPENSION INTRAMUSCULAR; INTRAVENOUS at 10:12

## 2022-05-16 RX ADMIN — SODIUM CHLORIDE 1000 MILLILITER(S): 9 INJECTION INTRAMUSCULAR; INTRAVENOUS; SUBCUTANEOUS at 11:39

## 2022-05-16 RX ADMIN — Medication 4: at 17:49

## 2022-05-16 RX ADMIN — Medication 101.6 MILLIGRAM(S): at 13:36

## 2022-05-16 RX ADMIN — Medication 25 MICROGRAM(S): at 09:15

## 2022-05-16 RX ADMIN — AMPICILLIN SODIUM AND SULBACTAM SODIUM 200 GRAM(S): 250; 125 INJECTION, POWDER, FOR SUSPENSION INTRAMUSCULAR; INTRAVENOUS at 05:22

## 2022-05-16 RX ADMIN — Medication 101.6 MILLIGRAM(S): at 06:56

## 2022-05-16 RX ADMIN — AMPICILLIN SODIUM AND SULBACTAM SODIUM 200 GRAM(S): 250; 125 INJECTION, POWDER, FOR SUSPENSION INTRAMUSCULAR; INTRAVENOUS at 23:23

## 2022-05-16 RX ADMIN — Medication 101.6 MILLIGRAM(S): at 21:52

## 2022-05-16 RX ADMIN — Medication 3: at 12:53

## 2022-05-16 RX ADMIN — Medication 2: at 08:15

## 2022-05-16 RX ADMIN — AMPICILLIN SODIUM AND SULBACTAM SODIUM 200 GRAM(S): 250; 125 INJECTION, POWDER, FOR SUSPENSION INTRAMUSCULAR; INTRAVENOUS at 17:20

## 2022-05-16 RX ADMIN — Medication 2: at 22:02

## 2022-05-16 RX ADMIN — CEFTRIAXONE 100 MILLIGRAM(S): 500 INJECTION, POWDER, FOR SOLUTION INTRAMUSCULAR; INTRAVENOUS at 17:20

## 2022-05-16 NOTE — ED CDU PROVIDER SUBSEQUENT DAY NOTE - HISTORY
CDU NOTE PA JACK: Pt resting in stretcher in NAD. VSS. Exam unchanged from prior. No Stridor / Drooling / Trismus. Mucosa moist, tongue/uvula midline, oropharynx clear Neck: + b/l parotid swelling, + b/l submandibular swelling +TTP L<R, supple,  trachea midline. Lymphatic: + lymphadenopathy. Pt without complaints. Will continue IV abx, steroids and ENT recs.

## 2022-05-16 NOTE — ED CDU PROVIDER SUBSEQUENT DAY NOTE - ATTENDING CONTRIBUTION TO CARE
51 y/o f with pmhx Maori speaking (language line [id number]) GERD, HTN, DM type 2, hypothyroidism (on levothyroxin) presented tot the ED for 3 months of facial swelling. Initial evaluation in the Emergency Department noted to have elevated lactate and was treated with abx and IVS with improvement. transferred to the CDU for monitoring and ENT eval. Of note, patient had similar episode approx 5 years ago and attributed the event to exposure to something cold. denies fever today.+ for entero virus and rhinovirus. no vaccination for MMR. Denies any other symptoms. no cough no abd pain no chest pain.   Gen.  well appearing female  HEENT:  mild b/l edema of parotid edema, uvula midline, patent airway. no trismus or drooling.   Lungs:  b/l bs  CVS: S1S2   Abd;  soft no tender no distention no guarding  Ext: no edema no erythema  Neuro:aaox3  MSK: moving all ext spon

## 2022-05-16 NOTE — ED CDU PROVIDER SUBSEQUENT DAY NOTE - NS ED ATTENDING STATEMENT MOD
I have seen and examined this patient and fully participated in the care of this patient as the teaching attending.  The service was shared with the BLAIR.  I reviewed and verified the documentation and independently performed the documented:

## 2022-05-16 NOTE — ED ADULT NURSE REASSESSMENT NOTE - NS ED NURSE REASSESS COMMENT FT1
Patient alert and oriented times 3. Denies c/o sore throat or shortness of breath. Tolerating diet. No difficulty swallowing. Plan of care discussed with patient to  notify nursing staff if pain level >2.  Denies c/o chest pain. Lactate 3.3. NS bolus given. Positioned for optimal comfort. Will continue to reassess.

## 2022-05-16 NOTE — ED CDU PROVIDER SUBSEQUENT DAY NOTE - PROGRESS NOTE DETAILS
evaluation performed with [facility]  #[id number]. Pt resting comfortably. NAD. No complaints. VSS. She states pain has improved, no sob or difficulty breathing, tolerating PO. Awaiting ENT re-eval. -Colette Perez PA-C Spoke with ENT who will come see pt this afternoon. -Colette Perez PA-C

## 2022-05-16 NOTE — H&P ADULT - HISTORY OF PRESENT ILLNESS
53 yo F pmhx htn dm hypothyroid p/w 3 months of b/l facial swelling. Started 3 months ago no inciting incident or trauma. intermittent worsening swelling and pain. seen by pcp given course of amoxacillin for 1 month with no improvement. Pt is able to tolerate po intake without any dysphagia, odynophagia. intermittent fevers with fatigue. no weight loss or night sweats. no sick contacts. Denies CP sob abd pain n/v dysuria. Seen by ENT in ED. TERA suggests Mumps.

## 2022-05-16 NOTE — ED CDU PROVIDER SUBSEQUENT DAY NOTE - PHYSICAL EXAMINATION
GEN: Pt non-toxic in NAD, alert, answering questions appropriately.  PSYCH: Affect and mood appropriate.  EYES: Sclera white w/o injection, EOMI.  ENT: +No Stridor / Drooling / Trismus. Mucosa moist, tongue/uvula midline, oropharynx clear Neck: + b/l parotid swelling, + b/l submandibular swelling +TTP L<R, supple,  trachea midline. Lymphatic: + lymphadenopathy.  RESP: CTA b/l, no wheezes, rales, or rhonchi.  CARDIAC: RRR, clear distinct S1, S2, no appreciable murmurs.  ABD: Abdomen soft, non-tender.  MSK: Moving all extremities.  NEURO: No focal motor or sensory deficits.  VASC: Radial pulses 2+ b/l. No edema.  SKIN: No rashes or lesions.

## 2022-05-16 NOTE — ED ADULT NURSE REASSESSMENT NOTE - NS ED NURSE REASSESS COMMENT FT1
Pt received from MICA Delgado. Pt oriented to CDU & plan of care was discussed. Pt endorses some pain to b/l neck. Some swelling noted. Pt denies any difficulty breathing or swallowing and states she does not want any pain meds at this time. Hot packs provided for comfort. Pt able to tolerate dinner without any difficulty. Safety & comfort measures maintained. Call bell in reach. Will continue to monitor.

## 2022-05-17 LAB
-  COAGULASE NEGATIVE STAPHYLOCOCCUS, METHICILLIN RESISTANT: SIGNIFICANT CHANGE UP
A1C WITH ESTIMATED AVERAGE GLUCOSE RESULT: 7.4 % — HIGH (ref 4–5.6)
ALBUMIN SERPL ELPH-MCNC: 4.3 G/DL — SIGNIFICANT CHANGE UP (ref 3.3–5)
ALP SERPL-CCNC: 73 U/L — SIGNIFICANT CHANGE UP (ref 40–120)
ALT FLD-CCNC: 29 U/L — SIGNIFICANT CHANGE UP (ref 10–45)
ANION GAP SERPL CALC-SCNC: 16 MMOL/L — SIGNIFICANT CHANGE UP (ref 5–17)
AST SERPL-CCNC: 17 U/L — SIGNIFICANT CHANGE UP (ref 10–40)
BILIRUB SERPL-MCNC: 0.5 MG/DL — SIGNIFICANT CHANGE UP (ref 0.2–1.2)
BUN SERPL-MCNC: 22 MG/DL — SIGNIFICANT CHANGE UP (ref 7–23)
CALCIUM SERPL-MCNC: 9.4 MG/DL — SIGNIFICANT CHANGE UP (ref 8.4–10.5)
CHLORIDE SERPL-SCNC: 103 MMOL/L — SIGNIFICANT CHANGE UP (ref 96–108)
CO2 SERPL-SCNC: 21 MMOL/L — LOW (ref 22–31)
CREAT SERPL-MCNC: 0.54 MG/DL — SIGNIFICANT CHANGE UP (ref 0.5–1.3)
EGFR: 111 ML/MIN/1.73M2 — SIGNIFICANT CHANGE UP
ESTIMATED AVERAGE GLUCOSE: 166 MG/DL — HIGH (ref 68–114)
GLUCOSE BLDC GLUCOMTR-MCNC: 238 MG/DL — HIGH (ref 70–99)
GLUCOSE BLDC GLUCOMTR-MCNC: 304 MG/DL — HIGH (ref 70–99)
GLUCOSE BLDC GLUCOMTR-MCNC: 306 MG/DL — HIGH (ref 70–99)
GLUCOSE BLDC GLUCOMTR-MCNC: 331 MG/DL — HIGH (ref 70–99)
GLUCOSE BLDC GLUCOMTR-MCNC: 345 MG/DL — HIGH (ref 70–99)
GLUCOSE SERPL-MCNC: 351 MG/DL — HIGH (ref 70–99)
GRAM STN FLD: SIGNIFICANT CHANGE UP
HCT VFR BLD CALC: 33.4 % — LOW (ref 34.5–45)
HGB BLD-MCNC: 10.7 G/DL — LOW (ref 11.5–15.5)
LACTATE SERPL-SCNC: 3.3 MMOL/L — HIGH (ref 0.7–2)
MCHC RBC-ENTMCNC: 29.8 PG — SIGNIFICANT CHANGE UP (ref 27–34)
MCHC RBC-ENTMCNC: 32 GM/DL — SIGNIFICANT CHANGE UP (ref 32–36)
MCV RBC AUTO: 93 FL — SIGNIFICANT CHANGE UP (ref 80–100)
METHOD TYPE: SIGNIFICANT CHANGE UP
MUV IGM FLD QL IA: <0.8 AU — SIGNIFICANT CHANGE UP (ref 0–0.79)
NRBC # BLD: 0 /100 WBCS — SIGNIFICANT CHANGE UP (ref 0–0)
PLATELET # BLD AUTO: 165 K/UL — SIGNIFICANT CHANGE UP (ref 150–400)
POTASSIUM SERPL-MCNC: 3.3 MMOL/L — LOW (ref 3.5–5.3)
POTASSIUM SERPL-SCNC: 3.3 MMOL/L — LOW (ref 3.5–5.3)
PROT SERPL-MCNC: 7 G/DL — SIGNIFICANT CHANGE UP (ref 6–8.3)
RBC # BLD: 3.59 M/UL — LOW (ref 3.8–5.2)
RBC # FLD: 13.2 % — SIGNIFICANT CHANGE UP (ref 10.3–14.5)
SODIUM SERPL-SCNC: 140 MMOL/L — SIGNIFICANT CHANGE UP (ref 135–145)
SPECIMEN SOURCE: SIGNIFICANT CHANGE UP
WBC # BLD: 12.75 K/UL — HIGH (ref 3.8–10.5)
WBC # FLD AUTO: 12.75 K/UL — HIGH (ref 3.8–10.5)

## 2022-05-17 PROCEDURE — 99223 1ST HOSP IP/OBS HIGH 75: CPT

## 2022-05-17 RX ORDER — INSULIN LISPRO 100/ML
2 VIAL (ML) SUBCUTANEOUS
Refills: 0 | Status: DISCONTINUED | OUTPATIENT
Start: 2022-05-17 | End: 2022-05-20

## 2022-05-17 RX ORDER — INSULIN LISPRO 100/ML
2 VIAL (ML) SUBCUTANEOUS ONCE
Refills: 0 | Status: DISCONTINUED | OUTPATIENT
Start: 2022-05-17 | End: 2022-05-17

## 2022-05-17 RX ADMIN — Medication 4: at 09:08

## 2022-05-17 RX ADMIN — Medication 2 UNIT(S): at 09:07

## 2022-05-17 RX ADMIN — Medication 2 UNIT(S): at 17:28

## 2022-05-17 RX ADMIN — Medication 101.6 MILLIGRAM(S): at 05:49

## 2022-05-17 RX ADMIN — Medication 4: at 17:27

## 2022-05-17 RX ADMIN — SIMVASTATIN 20 MILLIGRAM(S): 20 TABLET, FILM COATED ORAL at 21:15

## 2022-05-17 RX ADMIN — AMPICILLIN SODIUM AND SULBACTAM SODIUM 200 GRAM(S): 250; 125 INJECTION, POWDER, FOR SUSPENSION INTRAMUSCULAR; INTRAVENOUS at 05:13

## 2022-05-17 RX ADMIN — Medication 4: at 12:43

## 2022-05-17 RX ADMIN — AMPICILLIN SODIUM AND SULBACTAM SODIUM 200 GRAM(S): 250; 125 INJECTION, POWDER, FOR SUSPENSION INTRAMUSCULAR; INTRAVENOUS at 12:16

## 2022-05-17 RX ADMIN — Medication 2 UNIT(S): at 12:44

## 2022-05-17 RX ADMIN — VALSARTAN 160 MILLIGRAM(S): 80 TABLET ORAL at 06:59

## 2022-05-17 RX ADMIN — Medication 25 MICROGRAM(S): at 06:57

## 2022-05-17 RX ADMIN — Medication 1 MILLIGRAM(S): at 12:16

## 2022-05-17 NOTE — ED ADULT NURSE REASSESSMENT NOTE - NS ED NURSE REASSESS COMMENT FT1
07.00 Am Received the Pt from  MICA Noble . Pt is Observed for R/O  Mumps Pt is admitted Day 2 of admission  Pt is awaiting for bed . Pt is Lithuanian speaking interpretation service used interpreted by  Diane [id number]  . Received the Pt A&OX 4 obeys commands Renita N/V/D fever chills cp SOB   Comfort care & safety measures continued  IV site looks clean & dry no signs of infiltration noted pt denies  pain IV site .  Pt is advised to call for help  call bell with in the reach pt verbalized the understanding . Pt states her facial swelling is reducing she is pain free afebrile  . Discussed the care plan with NP  Rocio [id number]. Pt is on full liquid diet until further orders for possible ENT eval & ENT  scope   . GCS 15/15 A&OX 4 PERRLA  size 3 Strong upper & lower extremities steady gait  pt is ambulatory & independent   No facial droop  No Hand Leg drop denies numbness tingling Continue to monitor. Due meds given labs sent as per order

## 2022-05-17 NOTE — PATIENT PROFILE ADULT - FALL HARM RISK - UNIVERSAL INTERVENTIONS
Bed in lowest position, wheels locked, appropriate side rails in place/Call bell, personal items and telephone in reach/Instruct patient to call for assistance before getting out of bed or chair/Non-slip footwear when patient is out of bed/[place] to call system/Physically safe environment - no spills, clutter or unnecessary equipment/Purposeful Proactive Rounding/Room/bathroom lighting operational, light cord in reach

## 2022-05-17 NOTE — CHART NOTE - NSCHARTNOTEFT_GEN_A_CORE
Bcx Medicine NP note    CC: GPC bacteremia    Notified by RN of pt's Bcx results as below    Culture - Blood (05.16.22 @ 16:48)    -  Coagulase negative Staphylococcus, Methicillin resistant: Detec    Gram Stain:   Growth in aerobic bottle: Gram Positive Cocci in Clusters    Specimen Source: .Blood Blood-Peripheral    Organism: Blood Culture PCR    Culture Results:   Growth in aerobic bottle: Gram Positive Cocci in Clusters  Patient with non toxic appearance  HD stable    Vital Signs Last 24 Hrs  T(C): 36.7 (17 May 2022 20:39), Max: 36.9 (16 May 2022 23:38)  T(F): 98 (17 May 2022 20:39), Max: 98.5 (17 May 2022 09:04)  HR: 79 (17 May 2022 20:39) (79 - 100)  BP: 131/79 (17 May 2022 20:39) (128/80 - 148/85)  BP(mean): --  RR: 17 (17 May 2022 20:39) (16 - 17)  SpO2: 97% (17 May 2022 20:39) (95% - 98%)       A/P    53 yo F pmhx htn dm hypothyroid p/w 3 months of b/l facial swelling. Started 3 months ago no inciting incident or trauma. less Sjogren?   Per ID > ? Sjogren's   S/P unasun x1, d/cd by ID  Now Bcx results with coagulase negative Staphylococcus, Methicillin resistant: Detec  Femi contaminated sample  Patient HD stable  Will repeat Bcx x2   If pt develop fever/symptomatic then Broad spectrum coverage, Vanco  F/U ID am  Signed out to covering provider    Omid Davis [facility]  [id number]

## 2022-05-17 NOTE — ED ADULT NURSE REASSESSMENT NOTE - NS ED NURSE REASSESS COMMENT FT1
Interpretation [facility]  Service   Mark [id number]. Pt is updated on bed situation  and discontinuation of antibiotics &  Sternoids. Pt is feeling better able to eat Solid foods pain free Awaiting for IgM  Results  .  Continue to Monitor

## 2022-05-17 NOTE — ED ADULT NURSE REASSESSMENT NOTE - COMFORT CARE
meal provided/plan of care explained/po fluids offered
meal provided/plan of care explained/po fluids offered
plan of care explained/po fluids offered

## 2022-05-17 NOTE — ED ADULT NURSE REASSESSMENT NOTE - NS ED NURSE REASSESS COMMENT FT1
Notified NP Omid about + blood cultures. Per NP probable contamination. To repeat blood cultures in 1-2 hours, no indication for abx at this time, ID to follow up in AM. RN on 3[facility]hen made aware about labs.

## 2022-05-17 NOTE — ED ADULT NURSE REASSESSMENT NOTE - GENERAL PATIENT STATE
comfortable appearance/cooperative/family/SO at bedside/smiling/interactive
comfortable appearance/cooperative/smiling/interactive
comfortable appearance/cooperative/family/SO at bedside/smiling/interactive

## 2022-05-17 NOTE — CONSULT NOTE ADULT - SUBJECTIVE AND OBJECTIVE BOX
HPI:  52F   p/w 3 months of b/l facial swelling. Started 3 months ago no inciting incident or trauma. intermittent worsening swelling and pain. seen by pcp given course of amoxacillin for 1 month with no improvement. Pt is able to tolerate po intake without any dysphagia, odynophagia. intermittent fevers with fatigue. no weight loss or night sweats. no sick contacts. Denies CP sob abd pain n/v dysuria. Seen by ENT in ED. HALEY suggests Mumps. (16 May 2022 22:34)      PAST MEDICAL & SURGICAL HISTORY:  HTN (hypertension)      DM (diabetes mellitus)      Hypothyroidism      GERD (gastroesophageal reflux disease)      No significant past surgical history          Allergies    No Known Allergies    Intolerances        ANTIMICROBIALS:      OTHER MEDS:  acetaminophen     Tablet .. 1000 milliGRAM(s) Oral every 6 hours PRN  benzocaine 15 mG/menthol 3.6 mG Lozenge 1 Lozenge Oral every 3 hours PRN  dextrose 5%. 1000 milliLiter(s) IV Continuous <Continuous>  dextrose 5%. 1000 milliLiter(s) IV Continuous <Continuous>  dextrose 5%. 1000 milliLiter(s) IV Continuous <Continuous>  dextrose 50% Injectable 25 Gram(s) IV Push once  dextrose 50% Injectable 12.5 Gram(s) IV Push once  dextrose 50% Injectable 25 Gram(s) IV Push once  dextrose Oral Gel 15 Gram(s) Oral once PRN  folic acid 1 milliGRAM(s) Oral daily  glucagon  Injectable 1 milliGRAM(s) IntraMuscular once  glucagon  Injectable 1 milliGRAM(s) IntraMuscular once  ibuprofen  Tablet. 600 milliGRAM(s) Oral every 6 hours PRN  insulin lispro (ADMELOG) corrective regimen sliding scale   SubCutaneous three times a day before meals  insulin lispro (ADMELOG) corrective regimen sliding scale   SubCutaneous at bedtime  insulin lispro Injectable (ADMELOG) 2 Unit(s) SubCutaneous before breakfast  insulin lispro Injectable (ADMELOG) 2 Unit(s) SubCutaneous before lunch  insulin lispro Injectable (ADMELOG) 2 Unit(s) SubCutaneous before dinner  levothyroxine 25 MICROGram(s) Oral daily  simvastatin 20 milliGRAM(s) Oral at bedtime  valsartan 160 milliGRAM(s) Oral daily      SOCIAL HISTORY:    FAMILY HISTORY:  No pertinent family history in first degree relatives        ROS:  Unobtainable because:   All other systems negative   Constitutional: no fever, no chills  Eye: no vision changes  ENT: no sore throat, no rhinorrhea  Cardiovascular: no chest pain, no palpitation  Respiratory: no SOB, no cough  GI:  no abd pain, no vomiting, no diarrhea  urinary: no dysuria, no hematuria, no flank pain  musculoskeletal: no joint pain, no joint swelling  skin: no rash  neurology: no headache, no change in mental status  psych: no anxiety    Physical Exam:  General: awake, alert, non toxic  Head: atraumatic, normocephalic  Eyes: normal sclera and conjunctiva  ENT: no oropharyngeal lesions, no LAD, neck supple  Cardio: regular rate and rhythm   Respiratory: nonlabored on room air, clear bilaterally, no wheezing  abd: soft, bowel sounds present, not tender  : no suprapubic tenderness, no de jesus  Musculoskeletal: no joint swelling, no edema  Skin: no rash  vascular: no phlebitis  Neurologic: no focal deficits  psych: normal affect       Drug Dosing Weight  Height (cm): 167.6 (15 May 2022 06:38)  Weight (kg): 74.8 (15 May 2022 06:38)  BMI (kg/m2): 26.6 (15 May 2022 06:38)  BSA (m2): 1.84 (15 May 2022 06:38)    Vital Signs Last 24 Hrs  T(F): 98.4 (05-17-22 @ 11:27), Max: 100.8 (05-15-22 @ 07:40)    Vital Signs Last 24 Hrs  HR: 95 (05-17-22 @ 11:27) (86 - 114)  BP: 148/85 (05-17-22 @ 11:27) (113/76 - 148/85)  RR: 16 (05-17-22 @ 11:27)  SpO2: 95% (05-17-22 @ 11:27) (94% - 98%)  Wt(kg): --                          10.7   12.75 )-----------( 165      ( 17 May 2022 06:14 )             33.4       05-17    140  |  103  |  22  ----------------------------<  351<H>  3.3<L>   |  21<L>  |  0.54    Ca    9.4      17 May 2022 06:14    TPro  7.0  /  Alb  4.3  /  TBili  0.5  /  DBili  x   /  AST  17  /  ALT  29  /  AlkPhos  73  05-17          MICROBIOLOGY:  Culture - Urine (collected 05-15-22 @ 10:43)  Source: Clean Catch Clean Catch (Midstream)  Final Report (05-16-22 @ 12:07):    <10,000 CFU/mL Normal Urogenital Lashon    Culture - Blood (collected 05-15-22 @ 07:50)  Source: .Blood Blood-Peripheral  Preliminary Report (05-16-22 @ 15:01):    No growth to date.    Culture - Blood (collected 05-15-22 @ 07:35)  Source: .Blood Blood-Peripheral  Preliminary Report (05-16-22 @ 15:01):    No growth to date.      v    Rapid RVP Result: Detected (05-15 @ 12:35)        RADIOLOGY:  Images below reviewed personally HPI:   [id number]   52F has had three months of cheek pain and swelling, since Feb. This started after she felt a "tickle" in her throat/ear and has happened multiple times before including just last year. Usually she takes a "powerful capsule" and it helps but this time the swelling has persisted.   Imaging with parotid gland swelling.   ID asked about Mumps.   She has no fevers or chills during this time. No sick contacts in Feb. Lives with her  who has not been sick.   A course of Amoxicillin didn't help.   Difficult to open her mouth fully.   No earache, discharge or hearing changes.   No sore throat or rhinorrhea.     PAST MEDICAL & SURGICAL HISTORY:  HTN (hypertension)      DM (diabetes mellitus)      Hypothyroidism      GERD (gastroesophageal reflux disease)      No significant past surgical history          Allergies    No Known Allergies    Intolerances        ANTIMICROBIALS:      OTHER MEDS:  acetaminophen     Tablet .. 1000 milliGRAM(s) Oral every 6 hours PRN  benzocaine 15 mG/menthol 3.6 mG Lozenge 1 Lozenge Oral every 3 hours PRN  dextrose 5%. 1000 milliLiter(s) IV Continuous <Continuous>  dextrose 5%. 1000 milliLiter(s) IV Continuous <Continuous>  dextrose 5%. 1000 milliLiter(s) IV Continuous <Continuous>  dextrose 50% Injectable 25 Gram(s) IV Push once  dextrose 50% Injectable 12.5 Gram(s) IV Push once  dextrose 50% Injectable 25 Gram(s) IV Push once  dextrose Oral Gel 15 Gram(s) Oral once PRN  folic acid 1 milliGRAM(s) Oral daily  glucagon  Injectable 1 milliGRAM(s) IntraMuscular once  glucagon  Injectable 1 milliGRAM(s) IntraMuscular once  ibuprofen  Tablet. 600 milliGRAM(s) Oral every 6 hours PRN  insulin lispro (ADMELOG) corrective regimen sliding scale   SubCutaneous three times a day before meals  insulin lispro (ADMELOG) corrective regimen sliding scale   SubCutaneous at bedtime  insulin lispro Injectable (ADMELOG) 2 Unit(s) SubCutaneous before breakfast  insulin lispro Injectable (ADMELOG) 2 Unit(s) SubCutaneous before lunch  insulin lispro Injectable (ADMELOG) 2 Unit(s) SubCutaneous before dinner  levothyroxine 25 MICROGram(s) Oral daily  simvastatin 20 milliGRAM(s) Oral at bedtime  valsartan 160 milliGRAM(s) Oral daily      SOCIAL HISTORY: Nonsmoker     FAMILY HISTORY:  No pertinent family history in first degree relatives        ROS:  All other systems negative   Constitutional: no fever, no chills  Eye: no vision changes  ENT: per HPI   Cardiovascular: no chest pain, no palpitation  Respiratory: no SOB, no cough  GI:  no abd pain, no vomiting, no diarrhea  urinary: no dysuria, no hematuria, no flank pain  musculoskeletal: no joint pain, no joint swelling  skin: no rash  neurology: no headache, no change in mental status  psych: no anxiety    Physical Exam:  General: awake, alert, non toxic  Head: atraumatic, normocephalic  Eyes: normal sclera and conjunctiva  ENT: large swollen tender bilateral parotid glands. no gross oropharyngeal lesions   Cardio: regular rate and rhythm   Respiratory: nonlabored on room air, clear bilaterally, no wheezing  abd: soft, bowel sounds present, not tender  : no suprapubic tenderness, no de jesus  Musculoskeletal: no joint swelling, no edema  Skin: no rash  vascular: no phlebitis  Neurologic: no focal deficits  psych: normal affect       Drug Dosing Weight  Height (cm): 167.6 (15 May 2022 06:38)  Weight (kg): 74.8 (15 May 2022 06:38)  BMI (kg/m2): 26.6 (15 May 2022 06:38)  BSA (m2): 1.84 (15 May 2022 06:38)    Vital Signs Last 24 Hrs  T(F): 98.4 (05-17-22 @ 11:27), Max: 100.8 (05-15-22 @ 07:40)    Vital Signs Last 24 Hrs  HR: 95 (05-17-22 @ 11:27) (86 - 114)  BP: 148/85 (05-17-22 @ 11:27) (113/76 - 148/85)  RR: 16 (05-17-22 @ 11:27)  SpO2: 95% (05-17-22 @ 11:27) (94% - 98%)  Wt(kg): --                          10.7   12.75 )-----------( 165      ( 17 May 2022 06:14 )             33.4       05-17    140  |  103  |  22  ----------------------------<  351<H>  3.3<L>   |  21<L>  |  0.54    Ca    9.4      17 May 2022 06:14    TPro  7.0  /  Alb  4.3  /  TBili  0.5  /  DBili  x   /  AST  17  /  ALT  29  /  AlkPhos  73  05-17          MICROBIOLOGY:  Culture - Urine (collected 05-15-22 @ 10:43)  Source: Clean Catch Clean Catch (Midstream)  Final Report (05-16-22 @ 12:07):    <10,000 CFU/mL Normal Urogenital Lashon    Culture - Blood (collected 05-15-22 @ 07:50)  Source: .Blood Blood-Peripheral  Preliminary Report (05-16-22 @ 15:01):    No growth to date.    Culture - Blood (collected 05-15-22 @ 07:35)  Source: .Blood Blood-Peripheral  Preliminary Report (05-16-22 @ 15:01):    No growth to date.    Rapid RVP Result: Detected (05-15 @ 12:35)    RADIOLOGY:  Images below reviewed personally  CT Neck Soft Tissue w/ IV Cont (05.15.22 @ 09:32)   Symmetric enlargement of the BILATERAL parotid glands which fatty   infiltration consistent with sialosis    Xray Chest 1 View- PORTABLE-Urgent (05.15.22 @ 08:46)   Clear lungs.   HPI:   [id number]   52F has had three months of cheek pain and swelling, since Feb. This started after she felt a "tickle" in her throat/ear and has happened multiple times before including just last year. Usually she takes a "powerful capsule" and it helps but this time the swelling has persisted.   Imaging with parotid gland swelling.   ID asked about Mumps.   Febrile here 100.8F but she denies having any at home. No chills. No sick contacts in Feb. Lives with her  who has not been sick.   A course of Amoxicillin didn't help.   Difficult to open her mouth fully.   No earache, discharge or hearing changes.   No sore throat or rhinorrhea.     PAST MEDICAL & SURGICAL HISTORY:  HTN (hypertension)      DM (diabetes mellitus)      Hypothyroidism      GERD (gastroesophageal reflux disease)      No significant past surgical history          Allergies    No Known Allergies    Intolerances        ANTIMICROBIALS:      OTHER MEDS:  acetaminophen     Tablet .. 1000 milliGRAM(s) Oral every 6 hours PRN  benzocaine 15 mG/menthol 3.6 mG Lozenge 1 Lozenge Oral every 3 hours PRN  dextrose 5%. 1000 milliLiter(s) IV Continuous <Continuous>  dextrose 5%. 1000 milliLiter(s) IV Continuous <Continuous>  dextrose 5%. 1000 milliLiter(s) IV Continuous <Continuous>  dextrose 50% Injectable 25 Gram(s) IV Push once  dextrose 50% Injectable 12.5 Gram(s) IV Push once  dextrose 50% Injectable 25 Gram(s) IV Push once  dextrose Oral Gel 15 Gram(s) Oral once PRN  folic acid 1 milliGRAM(s) Oral daily  glucagon  Injectable 1 milliGRAM(s) IntraMuscular once  glucagon  Injectable 1 milliGRAM(s) IntraMuscular once  ibuprofen  Tablet. 600 milliGRAM(s) Oral every 6 hours PRN  insulin lispro (ADMELOG) corrective regimen sliding scale   SubCutaneous three times a day before meals  insulin lispro (ADMELOG) corrective regimen sliding scale   SubCutaneous at bedtime  insulin lispro Injectable (ADMELOG) 2 Unit(s) SubCutaneous before breakfast  insulin lispro Injectable (ADMELOG) 2 Unit(s) SubCutaneous before lunch  insulin lispro Injectable (ADMELOG) 2 Unit(s) SubCutaneous before dinner  levothyroxine 25 MICROGram(s) Oral daily  simvastatin 20 milliGRAM(s) Oral at bedtime  valsartan 160 milliGRAM(s) Oral daily      SOCIAL HISTORY: Nonsmoker     FAMILY HISTORY:  No pertinent family history in first degree relatives        ROS:  All other systems negative   Constitutional: no fever, no chills  Eye: no vision changes  ENT: per HPI   Cardiovascular: no chest pain, no palpitation  Respiratory: no SOB, no cough  GI:  no abd pain, no vomiting, no diarrhea  urinary: no dysuria, no hematuria, no flank pain  musculoskeletal: no joint pain, no joint swelling  skin: no rash  neurology: no headache, no change in mental status  psych: no anxiety    Physical Exam:  General: awake, alert, non toxic  Head: atraumatic, normocephalic  Eyes: normal sclera and conjunctiva  ENT: large swollen tender bilateral parotid glands. no gross oropharyngeal lesions   Cardio: regular rate and rhythm   Respiratory: nonlabored on room air, clear bilaterally, no wheezing  abd: soft, bowel sounds present, not tender  : no suprapubic tenderness, no de jesus  Musculoskeletal: no joint swelling, no edema  Skin: no rash  vascular: no phlebitis  Neurologic: no focal deficits  psych: normal affect       Drug Dosing Weight  Height (cm): 167.6 (15 May 2022 06:38)  Weight (kg): 74.8 (15 May 2022 06:38)  BMI (kg/m2): 26.6 (15 May 2022 06:38)  BSA (m2): 1.84 (15 May 2022 06:38)    Vital Signs Last 24 Hrs  T(F): 98.4 (05-17-22 @ 11:27), Max: 100.8 (05-15-22 @ 07:40)    Vital Signs Last 24 Hrs  HR: 95 (05-17-22 @ 11:27) (86 - 114)  BP: 148/85 (05-17-22 @ 11:27) (113/76 - 148/85)  RR: 16 (05-17-22 @ 11:27)  SpO2: 95% (05-17-22 @ 11:27) (94% - 98%)  Wt(kg): --                          10.7   12.75 )-----------( 165      ( 17 May 2022 06:14 )             33.4       05-17    140  |  103  |  22  ----------------------------<  351<H>  3.3<L>   |  21<L>  |  0.54    Ca    9.4      17 May 2022 06:14    TPro  7.0  /  Alb  4.3  /  TBili  0.5  /  DBili  x   /  AST  17  /  ALT  29  /  AlkPhos  73  05-17          MICROBIOLOGY:  Culture - Urine (collected 05-15-22 @ 10:43)  Source: Clean Catch Clean Catch (Midstream)  Final Report (05-16-22 @ 12:07):    <10,000 CFU/mL Normal Urogenital Lashon    Culture - Blood (collected 05-15-22 @ 07:50)  Source: .Blood Blood-Peripheral  Preliminary Report (05-16-22 @ 15:01):    No growth to date.    Culture - Blood (collected 05-15-22 @ 07:35)  Source: .Blood Blood-Peripheral  Preliminary Report (05-16-22 @ 15:01):    No growth to date.    Rapid RVP Result: Detected (05-15 @ 12:35)    RADIOLOGY:  Images below reviewed personally  CT Neck Soft Tissue w/ IV Cont (05.15.22 @ 09:32)   Symmetric enlargement of the BILATERAL parotid glands which fatty   infiltration consistent with sialosis    Xray Chest 1 View- PORTABLE-Urgent (05.15.22 @ 08:46)   Clear lungs.

## 2022-05-17 NOTE — ED ADULT NURSE REASSESSMENT NOTE - NS ED NURSE REASSESS COMMENT FT1
13.30 discussed NP Rocio [id number] regarding Dexamethazone doses . As pt is spiking in Blood sugar levels and pt has no throat pain  able to swallow well

## 2022-05-17 NOTE — CONSULT NOTE ADULT - ASSESSMENT
Episodic bilateral parotid gland swelling for years.   Current swelling ongoing since Feb.   Mumps doesn't make any sense in this context. I sent a buccal PCR swab anyways. Follow up IgM.   Suppurative parotitis doesn't fit either - I stopped Unasyn.   Would ask Rheumatology about other possibilities - Sjogren?   Would ask ENT about utility of a biopsy.     Discussed with medicine     Jez Winston MD   Infectious Disease   Available on TEAMS. After 5PM and on weekends please page fellow on call or call [phone]

## 2022-05-17 NOTE — ED ADULT NURSE REASSESSMENT NOTE - NS ED NURSE REASSESS COMMENT FT1
Pt received from MICA Copeland. Pt oriented to CDU & plan of care was discussed. Pt denies any difficulty breathing or swallowing. Pt with b/l cheek swelling near the ears. Pt states swelling has improved. Pt with pain b/l but does not want any pain meds at this time. Hot packs provided for comfort. Safety & comfort measures maintained. Call bell in reach. Will continue to monitor.  [id number]: Pt received from MICA Copeland. Pt oriented to CDU & plan of care was discussed. Pt denies any difficulty breathing or swallowing. Pt able to tolerate food without any difficulty. Pt with b/l cheek swelling near the ears. Pt states swelling has improved. Pt with pain b/l but does not want any pain meds at this time. Hot packs provided for comfort. Safety & comfort measures maintained. Call bell in reach. Will continue to monitor.

## 2022-05-18 DIAGNOSIS — R60.0 LOCALIZED EDEMA: ICD-10-CM

## 2022-05-18 LAB
A1C WITH ESTIMATED AVERAGE GLUCOSE RESULT: 7.5 % — HIGH (ref 4–5.6)
CRP SERPL-MCNC: <3 MG/L — SIGNIFICANT CHANGE UP (ref 0–4)
CULTURE RESULTS: SIGNIFICANT CHANGE UP
ESTIMATED AVERAGE GLUCOSE: 169 MG/DL — HIGH (ref 68–114)
GLUCOSE BLDC GLUCOMTR-MCNC: 192 MG/DL — HIGH (ref 70–99)
GLUCOSE BLDC GLUCOMTR-MCNC: 207 MG/DL — HIGH (ref 70–99)
GLUCOSE BLDC GLUCOMTR-MCNC: 212 MG/DL — HIGH (ref 70–99)
GLUCOSE BLDC GLUCOMTR-MCNC: 238 MG/DL — HIGH (ref 70–99)
ORGANISM # SPEC MICROSCOPIC CNT: SIGNIFICANT CHANGE UP
ORGANISM # SPEC MICROSCOPIC CNT: SIGNIFICANT CHANGE UP
SPECIMEN SOURCE: SIGNIFICANT CHANGE UP

## 2022-05-18 PROCEDURE — 99222 1ST HOSP IP/OBS MODERATE 55: CPT | Mod: GC

## 2022-05-18 PROCEDURE — 99233 SBSQ HOSP IP/OBS HIGH 50: CPT

## 2022-05-18 RX ADMIN — Medication 1: at 08:55

## 2022-05-18 RX ADMIN — Medication 600 MILLIGRAM(S): at 00:21

## 2022-05-18 RX ADMIN — Medication 25 MICROGRAM(S): at 06:01

## 2022-05-18 RX ADMIN — VALSARTAN 160 MILLIGRAM(S): 80 TABLET ORAL at 06:01

## 2022-05-18 RX ADMIN — Medication 2 UNIT(S): at 17:39

## 2022-05-18 RX ADMIN — Medication 2: at 17:39

## 2022-05-18 RX ADMIN — Medication 1 MILLIGRAM(S): at 12:30

## 2022-05-18 RX ADMIN — Medication 2: at 12:31

## 2022-05-18 RX ADMIN — Medication 2 UNIT(S): at 08:55

## 2022-05-18 RX ADMIN — SIMVASTATIN 20 MILLIGRAM(S): 20 TABLET, FILM COATED ORAL at 21:10

## 2022-05-18 RX ADMIN — Medication 600 MILLIGRAM(S): at 01:00

## 2022-05-18 RX ADMIN — Medication 2 UNIT(S): at 12:32

## 2022-05-18 NOTE — PROGRESS NOTE ADULT - ASSESSMENT
53 yo F w b/l parotid swelling x3 months. Unlikely infectious at this point. Malignancy unlikely given bilateral

## 2022-05-18 NOTE — CONSULT NOTE ADULT - ASSESSMENT
Differential for b/l parotiditis include: Sjogren's (primary vs secondary), small vessel vasculitis (ex. GPA), sarcoidosis, IgG4 disease   infectious etiology - less likely, given no improvement with multiple courses of Abx , mumps IgM negative     labs to check:  ESR, CRP, Immunoglobulin panel, IgG subset,  ANCA with MPO and PR3  vitamin D 25, 125, and ACE   lupus serologies         ** INCOMPLETE NOTE     Can Maynor, PGY-4  Rheumatology Fellow   Pager: [phone]  please enter a full 10 digit number for call back   During weekends, please page on call fellow       52yF with DM, HTN and hypothyroidism presented with b/l facial pain and swelling since Feb 2022. No inciting incident or trauma. seen by ENT, received a trial of Decadron with persistent pain and swelling. CT neck showed b/l partoid glands enlargement with fatty infiltration - consistent with sialosis. Rheumatology consulted for autoimmune jimmy     #  Differential for b/l parotiditis include: Sjogren's (primary vs secondary), sarcoidosis, IgG4 disease, small vessel vasculitis (ex. GPA)  infectious etiology - less likely, given no improvement with multiple courses of Abx , mumps IgM negative   malignancy - less likely given no mass or LN on CT      Recommend following labs:    - ESR, CRP, Immunoglobulin panel, IgG subset,  ANCA with MPO and PR3  - vitamin D 25, 125, and ACE  - MANNY, dsDNA, C3, C4, KOBY , SSA/SSB   - if serologies are negative, would strongly recommend bx for diagnostic purpose      Case discussed with attending     George Mcguire, PGY-4  Rheumatology Fellow   Pager: [phone]  please enter a full 10 digit number for call back   During weekends, please page on call fellow       52yF with DM, HTN and hypothyroidism presented with b/l facial pain and swelling since Feb 2022. No inciting incident or trauma. seen by ENT, received a trial of Decadron with persistent pain and swelling. CT neck showed b/l partoid glands enlargement with fatty infiltration - consistent with sialosis. Rheumatology consulted for autoimmune jimmy     #  Differential for b/l parotiditis include: Sjogren's (primary vs secondary), sarcoidosis, IgG4 disease, small vessel vasculitis (ex. GPA)  infectious etiology - less likely, given no improvement with multiple courses of Abx , mumps IgM negative, afebrile, no leukocytosis    malignancy - less likely given no mass or LN on CT      Recommend following labs:    - ESR, CRP, Immunoglobulin panel, IgG subset,  ANCA with MPO and PR3  - vitamin D 25, 125, and ACE  - MANNY, dsDNA, C3, C4, KOBY , SSA/SSB, APS labs   - if serologies are negative, would strongly recommend bx for diagnostic purpose      Case discussed with attending     George Mcguire, PGY-4  Rheumatology Fellow   Pager: [phone]  please enter a full 10 digit number for call back   During weekends, please page on call fellow

## 2022-05-18 NOTE — PROGRESS NOTE ADULT - ASSESSMENT
Five years of recurring bilateral parotid gland swelling and fevers.   Current swelling ongoing since Feb.   Sialosis/sialadenosis radiographically and might fit clinically with DM risk factor but I'm not sure it causes fevers and pain.   Mumps doesn't make any sense. I sent a buccal PCR swab anyways.   Suppurative parotitis doesn't fit, did not respond to outpatient Augmentin, I stopped Unasyn 5/17.   Feels better today. s/p three days of Decadron.     Suggest  -monitor off antibiotics  -f/u Mumps PCR   -Rheumatology - other possibilities, Sjogren?   -ENT - is this sialosis and if so, how do we manage it?     Discussed with Rheumatology and ENT     Jez Winston MD   Infectious Disease   Available on TEAMS. After 5PM and on weekends please page fellow on call or call [phone] Five years of recurring bilateral parotid gland swelling, pain and fevers.   Current swelling ongoing since Feb.   Sialosis/sialadenosis radiographically and might fit clinically with DM risk factor but I'm not sure it causes fevers and pain.   Mumps doesn't make any sense. I sent a buccal PCR swab anyways.   Suppurative parotitis doesn't fit and didn't respond to outpatient Augmentin, I stopped Unasyn 5/17.   Something her PMD gives her usually works but she doesn't know what it is.   Feels better today. s/p three days of Decadron.     Suggest  -monitor off antibiotics  -f/u Mumps PCR   -Rheumatology - other possibilities, Sjogren?   -ENT - is this sialosis and if so, how do we manage it?     Discussed with Rheumatology and ENT   I left a message with her PMD Dr Jesusita Silva [phone]     Jez Winston MD   Infectious Disease   Available on TEAMS. After 5PM and on weekends please page fellow on call or call [phone]

## 2022-05-18 NOTE — CONSULT NOTE ADULT - SUBJECTIVE AND OBJECTIVE BOX
PAWEL BROWNING  [id number]    HISTORY OF PRESENT ILLNESS:  51 yo F pmhx htn dm hypothyroid p/w 3 months of b/l facial swelling. Started 3 months ago no inciting incident or trauma. intermittent worsening swelling and pain. seen by pcp given course of amoxacillin for 1 month with no improvement. Pt is able to tolerate po intake without any dysphagia, odynophagia. intermittent fevers with fatigue. no weight loss or night sweats. no sick contacts. Denies CP sob abd pain n/v dysuria.    Differential for b/l parotiditis include: Sjogren's (primary vs secondary), small vessel vasculitis (ex. GPA), sarcoidosis, IgG4 disease   infectious etiology - less likely, given no improvement with multiple courses of Abx    PAST MEDICAL & SURGICAL HISTORY:  HTN (hypertension)      DM (diabetes mellitus)      Hypothyroidism      GERD (gastroesophageal reflux disease)      No significant past surgical history          Review of Systems:  Gen:  No fevers/chills, weight loss  HEENT: No blurry vision, no difficulty swallowing, no oral or nasal ulcers  CVS: No chest pain/palpitations  Resp: No SOB/wheezing  GI: No N/V/C/D/abdominal pain  MSK:  Skin: No new rashes  Neuro: No headaches    MEDICATIONS  (STANDING):  dextrose 5%. 1000 milliLiter(s) (100 mL/Hr) IV Continuous <Continuous>  dextrose 5%. 1000 milliLiter(s) (50 mL/Hr) IV Continuous <Continuous>  dextrose 5%. 1000 milliLiter(s) (100 mL/Hr) IV Continuous <Continuous>  dextrose 50% Injectable 25 Gram(s) IV Push once  dextrose 50% Injectable 12.5 Gram(s) IV Push once  dextrose 50% Injectable 25 Gram(s) IV Push once  folic acid 1 milliGRAM(s) Oral daily  glucagon  Injectable 1 milliGRAM(s) IntraMuscular once  glucagon  Injectable 1 milliGRAM(s) IntraMuscular once  insulin lispro (ADMELOG) corrective regimen sliding scale   SubCutaneous three times a day before meals  insulin lispro (ADMELOG) corrective regimen sliding scale   SubCutaneous at bedtime  insulin lispro Injectable (ADMELOG) 2 Unit(s) SubCutaneous before breakfast  insulin lispro Injectable (ADMELOG) 2 Unit(s) SubCutaneous before lunch  insulin lispro Injectable (ADMELOG) 2 Unit(s) SubCutaneous before dinner  levothyroxine 25 MICROGram(s) Oral daily  simvastatin 20 milliGRAM(s) Oral at bedtime  valsartan 160 milliGRAM(s) Oral daily    MEDICATIONS  (PRN):  acetaminophen     Tablet .. 1000 milliGRAM(s) Oral every 6 hours PRN Temp greater or equal to 38C (100.4F), Mild Pain (1 - 3)  benzocaine 15 mG/menthol 3.6 mG Lozenge 1 Lozenge Oral every 3 hours PRN Sore Throat  dextrose Oral Gel 15 Gram(s) Oral once PRN Blood Glucose LESS THAN 70 milliGRAM(s)/deciliter  ibuprofen  Tablet. 600 milliGRAM(s) Oral every 6 hours PRN Temp greater or equal to 38C (100.4F), Moderate Pain (4 - 6)      Allergies    No Known Allergies    Intolerances        PERTINENT MEDICATION HISTORY:    SOCIAL HISTORY:  OCCUPATION:  TRAVEL HISTORY:    FAMILY HISTORY:  No pertinent family history in first degree relatives        Vital Signs Last 24 Hrs  T(C): 36.9 (18 May 2022 08:05), Max: 36.9 (17 May 2022 15:59)  T(F): 98.4 (18 May 2022 08:05), Max: 98.5 (17 May 2022 15:59)  HR: 71 (18 May 2022 08:05) (71 - 94)  BP: 143/82 (18 May 2022 08:05) (131/79 - 147/88)  BP(mean): --  RR: 18 (18 May 2022 08:05) (16 - 18)  SpO2: 96% (18 May 2022 08:05) (96% - 98%)    Physical Exam:  General: No apparent distress  HEENT: EOMI, MMM  CVS: +S1/S2, RRR, no murmurs/rubs/gallops  Resp: CTA b/l. No crackles/wheezing  GI: Soft, NT/ND +BS  MSK:  Neuro: AAOx3  Skin: no visible rashes    LABS:                        10.7   12.75 )-----------( 165      ( 17 May 2022 06:14 )             33.4     05-17    140  |  103  |  22  ----------------------------<  351<H>  3.3<L>   |  21<L>  |  0.54    Ca    9.4      17 May 2022 06:14    TPro  7.0  /  Alb  4.3  /  TBili  0.5  /  DBili  x   /  AST  17  /  ALT  29  /  AlkPhos  73  05-17          RADIOLOGY & ADDITIONAL STUDIES:     PAWEL BROWNING  [id number]    HISTORY OF PRESENT ILLNESS:  51 yo F pmhx htn dm hypothyroid p/w 3 months of b/l facial swelling. Started 3 months ago no inciting incident or trauma. intermittent worsening swelling and pain. seen by pcp given course of amoxacillin for 1 month with no improvement. Pt is able to tolerate po intake without any dysphagia, odynophagia. intermittent fevers with fatigue. no weight loss or night sweats. no sick contacts. Denies CP sob abd pain n/v dysuria.    PAST MEDICAL & SURGICAL HISTORY:  HTN (hypertension)      DM (diabetes mellitus)      Hypothyroidism      GERD (gastroesophageal reflux disease)      No significant past surgical history          Review of Systems:  Gen:  No fevers/chills, weight loss  HEENT: No blurry vision, no difficulty swallowing, no oral or nasal ulcers  CVS: No chest pain/palpitations  Resp: No SOB/wheezing  GI: No N/V/C/D/abdominal pain  MSK:  Skin: No new rashes  Neuro: No headaches    MEDICATIONS  (STANDING):  dextrose 5%. 1000 milliLiter(s) (100 mL/Hr) IV Continuous <Continuous>  dextrose 5%. 1000 milliLiter(s) (50 mL/Hr) IV Continuous <Continuous>  dextrose 5%. 1000 milliLiter(s) (100 mL/Hr) IV Continuous <Continuous>  dextrose 50% Injectable 25 Gram(s) IV Push once  dextrose 50% Injectable 12.5 Gram(s) IV Push once  dextrose 50% Injectable 25 Gram(s) IV Push once  folic acid 1 milliGRAM(s) Oral daily  glucagon  Injectable 1 milliGRAM(s) IntraMuscular once  glucagon  Injectable 1 milliGRAM(s) IntraMuscular once  insulin lispro (ADMELOG) corrective regimen sliding scale   SubCutaneous three times a day before meals  insulin lispro (ADMELOG) corrective regimen sliding scale   SubCutaneous at bedtime  insulin lispro Injectable (ADMELOG) 2 Unit(s) SubCutaneous before breakfast  insulin lispro Injectable (ADMELOG) 2 Unit(s) SubCutaneous before lunch  insulin lispro Injectable (ADMELOG) 2 Unit(s) SubCutaneous before dinner  levothyroxine 25 MICROGram(s) Oral daily  simvastatin 20 milliGRAM(s) Oral at bedtime  valsartan 160 milliGRAM(s) Oral daily    MEDICATIONS  (PRN):  acetaminophen     Tablet .. 1000 milliGRAM(s) Oral every 6 hours PRN Temp greater or equal to 38C (100.4F), Mild Pain (1 - 3)  benzocaine 15 mG/menthol 3.6 mG Lozenge 1 Lozenge Oral every 3 hours PRN Sore Throat  dextrose Oral Gel 15 Gram(s) Oral once PRN Blood Glucose LESS THAN 70 milliGRAM(s)/deciliter  ibuprofen  Tablet. 600 milliGRAM(s) Oral every 6 hours PRN Temp greater or equal to 38C (100.4F), Moderate Pain (4 - 6)      Allergies    No Known Allergies    Intolerances        PERTINENT MEDICATION HISTORY:    SOCIAL HISTORY:  OCCUPATION:  TRAVEL HISTORY:    FAMILY HISTORY:  No pertinent family history in first degree relatives        Vital Signs Last 24 Hrs  T(C): 36.9 (18 May 2022 08:05), Max: 36.9 (17 May 2022 15:59)  T(F): 98.4 (18 May 2022 08:05), Max: 98.5 (17 May 2022 15:59)  HR: 71 (18 May 2022 08:05) (71 - 94)  BP: 143/82 (18 May 2022 08:05) (131/79 - 147/88)  BP(mean): --  RR: 18 (18 May 2022 08:05) (16 - 18)  SpO2: 96% (18 May 2022 08:05) (96% - 98%)    Physical Exam:  General: No apparent distress  HEENT: EOMI, MMM  CVS: +S1/S2, RRR, no murmurs/rubs/gallops  Resp: CTA b/l. No crackles/wheezing  GI: Soft, NT/ND +BS  MSK:  Neuro: AAOx3  Skin: no visible rashes    LABS:                        10.7   12.75 )-----------( 165      ( 17 May 2022 06:14 )             33.4     05-17    140  |  103  |  22  ----------------------------<  351<H>  3.3<L>   |  21<L>  |  0.54    Ca    9.4      17 May 2022 06:14    TPro  7.0  /  Alb  4.3  /  TBili  0.5  /  DBili  x   /  AST  17  /  ALT  29  /  AlkPhos  73  05-17      RADIOLOGY & ADDITIONAL STUDIES:  < from: CT Neck Soft Tissue w/ IV Cont (05.15.22 @ 09:32) >    FINDINGS:  AERODIGESTIVE TRACT:  Normal.  LYMPH NODES:  No adenopathy.  PAROTID GLANDS:  Symmetric enlargement of the BILATERAL parotid glands   which fatty infiltration consistent with sialosis  SUBMANDIBULAR GLANDS:  Normal.  THYROID GLAND:  Normal.  VISUALIZED PARANASAL SINUSES:  Clear.  VISUALIZED TYMPANOMASTOID CAVITIES: Clear.  BONES:  Normal.  MISCELLANEOUS:  None.    IMPRESSION:  Symmetric enlargement of the BILATERAL parotid glands which fatty   infiltration consistent with sialosis    < end of copied text >       PAWEL BROWNING  [id number]    HISTORY OF PRESENT ILLNESS:  51 yo F pmhx htn dm hypothyroid p/w 3 months of b/l facial swelling. Started 3 months ago no inciting incident or trauma. intermittent worsening swelling and pain. seen by pcp given course of amoxacillin for 1 month with no improvement. Pt is able to tolerate po intake without any dysphagia, odynophagia. intermittent fevers with fatigue. no weight loss or night sweats. no sick contacts. Denies CP sob abd pain n/v dysuria.    [facility]  [id number]  b/l parotid swelling and pain since Feb 2022, had previous episodes, but usually resolved within days  rated pain 10/10 in Feb 2022, now 5/10, but swelling persisted, pain with chewing    no vision changes, + chronic dry eyes and dry mouth   + oral ulcers (but also endorses chewing hard shell nuts)   no genital ulcers   denies SOB, CP, n/v, abd pain, diarrhea  had fever 3 days prior to her current admission -  which she attributed to       PAST MEDICAL & SURGICAL HISTORY:  HTN (hypertension)      DM (diabetes mellitus)      Hypothyroidism      GERD (gastroesophageal reflux disease)      No significant past surgical history          Review of Systems:  Gen:  No fevers/chills, weight loss  HEENT: No blurry vision, no difficulty swallowing, no oral or nasal ulcers  CVS: No chest pain/palpitations  Resp: No SOB/wheezing  GI: No N/V/C/D/abdominal pain  MSK:  Skin: No new rashes  Neuro: No headaches    MEDICATIONS  (STANDING):  dextrose 5%. 1000 milliLiter(s) (100 mL/Hr) IV Continuous <Continuous>  dextrose 5%. 1000 milliLiter(s) (50 mL/Hr) IV Continuous <Continuous>  dextrose 5%. 1000 milliLiter(s) (100 mL/Hr) IV Continuous <Continuous>  dextrose 50% Injectable 25 Gram(s) IV Push once  dextrose 50% Injectable 12.5 Gram(s) IV Push once  dextrose 50% Injectable 25 Gram(s) IV Push once  folic acid 1 milliGRAM(s) Oral daily  glucagon  Injectable 1 milliGRAM(s) IntraMuscular once  glucagon  Injectable 1 milliGRAM(s) IntraMuscular once  insulin lispro (ADMELOG) corrective regimen sliding scale   SubCutaneous three times a day before meals  insulin lispro (ADMELOG) corrective regimen sliding scale   SubCutaneous at bedtime  insulin lispro Injectable (ADMELOG) 2 Unit(s) SubCutaneous before breakfast  insulin lispro Injectable (ADMELOG) 2 Unit(s) SubCutaneous before lunch  insulin lispro Injectable (ADMELOG) 2 Unit(s) SubCutaneous before dinner  levothyroxine 25 MICROGram(s) Oral daily  simvastatin 20 milliGRAM(s) Oral at bedtime  valsartan 160 milliGRAM(s) Oral daily    MEDICATIONS  (PRN):  acetaminophen     Tablet .. 1000 milliGRAM(s) Oral every 6 hours PRN Temp greater or equal to 38C (100.4F), Mild Pain (1 - 3)  benzocaine 15 mG/menthol 3.6 mG Lozenge 1 Lozenge Oral every 3 hours PRN Sore Throat  dextrose Oral Gel 15 Gram(s) Oral once PRN Blood Glucose LESS THAN 70 milliGRAM(s)/deciliter  ibuprofen  Tablet. 600 milliGRAM(s) Oral every 6 hours PRN Temp greater or equal to 38C (100.4F), Moderate Pain (4 - 6)      Allergies    No Known Allergies    Intolerances        PERTINENT MEDICATION HISTORY:    SOCIAL HISTORY:  OCCUPATION:  TRAVEL HISTORY:    FAMILY HISTORY:  No pertinent family history in first degree relatives        Vital Signs Last 24 Hrs  T(C): 36.9 (18 May 2022 08:05), Max: 36.9 (17 May 2022 15:59)  T(F): 98.4 (18 May 2022 08:05), Max: 98.5 (17 May 2022 15:59)  HR: 71 (18 May 2022 08:05) (71 - 94)  BP: 143/82 (18 May 2022 08:05) (131/79 - 147/88)  BP(mean): --  RR: 18 (18 May 2022 08:05) (16 - 18)  SpO2: 96% (18 May 2022 08:05) (96% - 98%)    Physical Exam:  General: No apparent distress  HEENT: EOMI, MMM  CVS: +S1/S2, RRR, no murmurs/rubs/gallops  Resp: CTA b/l. No crackles/wheezing  GI: Soft, NT/ND +BS  MSK:  Neuro: AAOx3  Skin: no visible rashes    LABS:                        10.7   12.75 )-----------( 165      ( 17 May 2022 06:14 )             33.4     05-17    140  |  103  |  22  ----------------------------<  351<H>  3.3<L>   |  21<L>  |  0.54    Ca    9.4      17 May 2022 06:14    TPro  7.0  /  Alb  4.3  /  TBili  0.5  /  DBili  x   /  AST  17  /  ALT  29  /  AlkPhos  73  05-17      RADIOLOGY & ADDITIONAL STUDIES:  < from: CT Neck Soft Tissue w/ IV Cont (05.15.22 @ 09:32) >    FINDINGS:  AERODIGESTIVE TRACT:  Normal.  LYMPH NODES:  No adenopathy.  PAROTID GLANDS:  Symmetric enlargement of the BILATERAL parotid glands   which fatty infiltration consistent with sialosis  SUBMANDIBULAR GLANDS:  Normal.  THYROID GLAND:  Normal.  VISUALIZED PARANASAL SINUSES:  Clear.  VISUALIZED TYMPANOMASTOID CAVITIES: Clear.  BONES:  Normal.  MISCELLANEOUS:  None.    IMPRESSION:  Symmetric enlargement of the BILATERAL parotid glands which fatty   infiltration consistent with sialosis    < end of copied text >       PAWEL BROWNING  [id number]    HISTORY OF PRESENT ILLNESS:  53 yo F pmhx htn dm hypothyroid p/w 3 months of b/l facial swelling. Started 3 months ago no inciting incident or trauma. intermittent worsening swelling and pain. seen by pcp given course of amoxacillin for 1 month with no improvement. Pt is able to tolerate po intake without any dysphagia, odynophagia. intermittent fevers with fatigue. no weight loss or night sweats. no sick contacts. Denies CP sob abd pain n/v dysuria.    received Decadron 6mg x1 on 5/15  and Decadron 8mg x 5 on 5/16 and 5/17     [facility]  [id number]  b/l parotid swelling and pain since Feb 2022, had previous episodes, but usually resolved within days  rated pain 10/10 in Feb 2022, now 5/10, but swelling persisted, pain with chewing    no vision changes, + chronic dry eyes and dry mouth   + oral ulcers (but also endorses chewing hard shell nuts)   no genital ulcers   denies SOB, CP, n/v, abd pain, diarrhea, joint pain/swelling   had fever 3 days prior to her current admission -  which she attributed to UTI          PAST MEDICAL & SURGICAL HISTORY:  HTN (hypertension)      DM (diabetes mellitus)      Hypothyroidism      GERD (gastroesophageal reflux disease)      No significant past surgical history          Review of Systems:  Gen:  No fevers/chills, weight loss  HEENT: No blurry vision, no difficulty swallowing, no oral or nasal ulcers  CVS: No chest pain/palpitations  Resp: No SOB/wheezing  GI: No N/V/C/D/abdominal pain  MSK:  Skin: No new rashes  Neuro: No headaches    MEDICATIONS  (STANDING):  dextrose 5%. 1000 milliLiter(s) (100 mL/Hr) IV Continuous <Continuous>  dextrose 5%. 1000 milliLiter(s) (50 mL/Hr) IV Continuous <Continuous>  dextrose 5%. 1000 milliLiter(s) (100 mL/Hr) IV Continuous <Continuous>  dextrose 50% Injectable 25 Gram(s) IV Push once  dextrose 50% Injectable 12.5 Gram(s) IV Push once  dextrose 50% Injectable 25 Gram(s) IV Push once  folic acid 1 milliGRAM(s) Oral daily  glucagon  Injectable 1 milliGRAM(s) IntraMuscular once  glucagon  Injectable 1 milliGRAM(s) IntraMuscular once  insulin lispro (ADMELOG) corrective regimen sliding scale   SubCutaneous three times a day before meals  insulin lispro (ADMELOG) corrective regimen sliding scale   SubCutaneous at bedtime  insulin lispro Injectable (ADMELOG) 2 Unit(s) SubCutaneous before breakfast  insulin lispro Injectable (ADMELOG) 2 Unit(s) SubCutaneous before lunch  insulin lispro Injectable (ADMELOG) 2 Unit(s) SubCutaneous before dinner  levothyroxine 25 MICROGram(s) Oral daily  simvastatin 20 milliGRAM(s) Oral at bedtime  valsartan 160 milliGRAM(s) Oral daily    MEDICATIONS  (PRN):  acetaminophen     Tablet .. 1000 milliGRAM(s) Oral every 6 hours PRN Temp greater or equal to 38C (100.4F), Mild Pain (1 - 3)  benzocaine 15 mG/menthol 3.6 mG Lozenge 1 Lozenge Oral every 3 hours PRN Sore Throat  dextrose Oral Gel 15 Gram(s) Oral once PRN Blood Glucose LESS THAN 70 milliGRAM(s)/deciliter  ibuprofen  Tablet. 600 milliGRAM(s) Oral every 6 hours PRN Temp greater or equal to 38C (100.4F), Moderate Pain (4 - 6)      Allergies    No Known Allergies    Intolerances        PERTINENT MEDICATION HISTORY:    SOCIAL HISTORY:  OCCUPATION:  TRAVEL HISTORY:    FAMILY HISTORY:  No pertinent family history in first degree relatives        Vital Signs Last 24 Hrs  T(C): 36.9 (18 May 2022 08:05), Max: 36.9 (17 May 2022 15:59)  T(F): 98.4 (18 May 2022 08:05), Max: 98.5 (17 May 2022 15:59)  HR: 71 (18 May 2022 08:05) (71 - 94)  BP: 143/82 (18 May 2022 08:05) (131/79 - 147/88)  BP(mean): --  RR: 18 (18 May 2022 08:05) (16 - 18)  SpO2: 96% (18 May 2022 08:05) (96% - 98%)    Physical Exam:  General: NAD  HEENT: EOMI, MMM, + dental cavities, no oral ulcers, b/l parotid glands TTP, + facial swelling   neck: no lymphadenopathy   CVS: +S1/S2, RRR, no murmurs/rubs/gallops  Resp: CTA b/l. No crackles/wheezing  GI: Soft, NT/ND +BS  MSK: no synovitis, Joints NTP    Neuro: AAOx3  Skin: no visible rashes, normal skin turgor     LABS:                        10.7   12.75 )-----------( 165      ( 17 May 2022 06:14 )             33.4     05-17    140  |  103  |  22  ----------------------------<  351<H>  3.3<L>   |  21<L>  |  0.54    Ca    9.4      17 May 2022 06:14    TPro  7.0  /  Alb  4.3  /  TBili  0.5  /  DBili  x   /  AST  17  /  ALT  29  /  AlkPhos  73  05-17      RADIOLOGY & ADDITIONAL STUDIES:  < from: CT Neck Soft Tissue w/ IV Cont (05.15.22 @ 09:32) >    FINDINGS:  AERODIGESTIVE TRACT:  Normal.  LYMPH NODES:  No adenopathy.  PAROTID GLANDS:  Symmetric enlargement of the BILATERAL parotid glands   which fatty infiltration consistent with sialosis  SUBMANDIBULAR GLANDS:  Normal.  THYROID GLAND:  Normal.  VISUALIZED PARANASAL SINUSES:  Clear.  VISUALIZED TYMPANOMASTOID CAVITIES: Clear.  BONES:  Normal.  MISCELLANEOUS:  None.    IMPRESSION:  Symmetric enlargement of the BILATERAL parotid glands which fatty   infiltration consistent with sialosis    < end of copied text >       PAWEL BROWNING  [id number]    HISTORY OF PRESENT ILLNESS:  51 yo F pmhx htn dm hypothyroid p/w 3 months of b/l facial swelling. Started 3 months ago no inciting incident or trauma. intermittent worsening swelling and pain. seen by pcp given course of amoxacillin for 1 month with no improvement. Pt is able to tolerate po intake without any dysphagia, odynophagia. intermittent fevers with fatigue. no weight loss or night sweats. no sick contacts. Denies CP sob abd pain n/v dysuria.    hospital course: afebrile, hemodynamically stable   received Decadron 6mg x1 on 5/15  and Decadron 8mg x 5 on 5/16 and 5/17     [facility]  [id number]  b/l jaw and face swelling and pain since Feb 2022, had previous episodes, but usually resolved within days  rated pain 10/10 in Feb 2022, now 5/10, but swelling persisted, pain with chew food, but has no dysphagia    no vision changes, + chronic dry eyes and dry mouth   + oral ulcers (but also endorses chewing hard shell nuts)   no genital ulcers   denies SOB, CP, n/v, abd pain, diarrhea, joint pain/swelling   had fever 3 days prior to her current admission -  which she attributed to UTI    no hx of IBD  UTD on all her malignancy screening - including endoscopy, colonoscopy and mammogram     no hx of DVT or PE   ob gyn hx: had 2 first trimester miscarriages          PAST MEDICAL & SURGICAL HISTORY:  HTN (hypertension)  DM (diabetes mellitus)  Hypothyroidis  GERD (gastroesophageal reflux disease  No significant past surgical history      Review of Systems:  Gen:  No fevers/chills, weight loss  HEENT: No blurry vision, see HPI   CVS: No chest pain/palpitations  Resp: No SOB/wheezing  GI: No N/V/C/D/abdominal pain  MSK: no joint complains   Skin: No new rashes  Neuro: No headaches    MEDICATIONS  (STANDING):  dextrose 5%. 1000 milliLiter(s) (100 mL/Hr) IV Continuous <Continuous>  dextrose 5%. 1000 milliLiter(s) (50 mL/Hr) IV Continuous <Continuous>  dextrose 5%. 1000 milliLiter(s) (100 mL/Hr) IV Continuous <Continuous>  dextrose 50% Injectable 25 Gram(s) IV Push once  dextrose 50% Injectable 12.5 Gram(s) IV Push once  dextrose 50% Injectable 25 Gram(s) IV Push once  folic acid 1 milliGRAM(s) Oral daily  glucagon  Injectable 1 milliGRAM(s) IntraMuscular once  glucagon  Injectable 1 milliGRAM(s) IntraMuscular once  insulin lispro (ADMELOG) corrective regimen sliding scale   SubCutaneous three times a day before meals  insulin lispro (ADMELOG) corrective regimen sliding scale   SubCutaneous at bedtime  insulin lispro Injectable (ADMELOG) 2 Unit(s) SubCutaneous before breakfast  insulin lispro Injectable (ADMELOG) 2 Unit(s) SubCutaneous before lunch  insulin lispro Injectable (ADMELOG) 2 Unit(s) SubCutaneous before dinner  levothyroxine 25 MICROGram(s) Oral daily  simvastatin 20 milliGRAM(s) Oral at bedtime  valsartan 160 milliGRAM(s) Oral daily    MEDICATIONS  (PRN):  acetaminophen     Tablet .. 1000 milliGRAM(s) Oral every 6 hours PRN Temp greater or equal to 38C (100.4F), Mild Pain (1 - 3)  benzocaine 15 mG/menthol 3.6 mG Lozenge 1 Lozenge Oral every 3 hours PRN Sore Throat  dextrose Oral Gel 15 Gram(s) Oral once PRN Blood Glucose LESS THAN 70 milliGRAM(s)/deciliter  ibuprofen  Tablet. 600 milliGRAM(s) Oral every 6 hours PRN Temp greater or equal to 38C (100.4F), Moderate Pain (4 - 6)      Allergies  No Known Allergies  Intolerances    PERTINENT MEDICATION HISTORY:    SOCIAL HISTORY: born in [facility],  not working currently, denies toxic habits       FAMILY HISTORY:  No pertinent family history in first degree relatives        Vital Signs Last 24 Hrs  T(C): 36.9 (18 May 2022 08:05), Max: 36.9 (17 May 2022 15:59)  T(F): 98.4 (18 May 2022 08:05), Max: 98.5 (17 May 2022 15:59)  HR: 71 (18 May 2022 08:05) (71 - 94)  BP: 143/82 (18 May 2022 08:05) (131/79 - 147/88)  BP(mean): --  RR: 18 (18 May 2022 08:05) (16 - 18)  SpO2: 96% (18 May 2022 08:05) (96% - 98%)    Physical Exam:  General: NAD  HEENT: EOMI, MMM, + dental cavities, no oral ulcers, b/l parotid glands TTP, + facial swelling   neck: no lymphadenopathy   CVS: +S1/S2, RRR, no murmurs/rubs/gallops  Resp: CTA b/l. No crackles/wheezing  GI: Soft, NT/ND +BS  MSK: no synovitis, Joints NTP    Neuro: AAOx3  Skin: no visible rashes, normal skin turgor     LABS:                        10.7   12.75 )-----------( 165      ( 17 May 2022 06:14 )             33.4     05-17    140  |  103  |  22  ----------------------------<  351<H>  3.3<L>   |  21<L>  |  0.54    Ca    9.4      17 May 2022 06:14    TPro  7.0  /  Alb  4.3  /  TBili  0.5  /  DBili  x   /  AST  17  /  ALT  29  /  AlkPhos  73  05-17      RADIOLOGY & ADDITIONAL STUDIES:  < from: CT Neck Soft Tissue w/ IV Cont (05.15.22 @ 09:32) >    FINDINGS:  AERODIGESTIVE TRACT:  Normal.  LYMPH NODES:  No adenopathy.  PAROTID GLANDS:  Symmetric enlargement of the BILATERAL parotid glands   which fatty infiltration consistent with sialosis  SUBMANDIBULAR GLANDS:  Normal.  THYROID GLAND:  Normal.  VISUALIZED PARANASAL SINUSES:  Clear.  VISUALIZED TYMPANOMASTOID CAVITIES: Clear.  BONES:  Normal.  MISCELLANEOUS:  None.    IMPRESSION:  Symmetric enlargement of the BILATERAL parotid glands which fatty   infiltration consistent with sialosis    < end of copied text >

## 2022-05-18 NOTE — CONSULT NOTE ADULT - ATTENDING COMMENTS
Patient with b/l parotid gland swelling in the setting of sicca symptoms and poor dentition   suspect primary vs secondary Sjogren's syndrome  check labs as outlined above  may need a parotid gland biopsy to rule out malignancy/IgG4 related disease vs other

## 2022-05-19 LAB
24R-OH-CALCIDIOL SERPL-MCNC: 18.9 NG/ML — LOW (ref 30–80)
ALBUMIN SERPL ELPH-MCNC: 4.1 G/DL — SIGNIFICANT CHANGE UP (ref 3.3–5)
ALP SERPL-CCNC: 72 U/L — SIGNIFICANT CHANGE UP (ref 40–120)
ALT FLD-CCNC: 59 U/L — HIGH (ref 10–45)
ANION GAP SERPL CALC-SCNC: 14 MMOL/L — SIGNIFICANT CHANGE UP (ref 5–17)
ANTI-RIBONUCLEAR PROTEIN: 0.3 AI — SIGNIFICANT CHANGE UP
AST SERPL-CCNC: 39 U/L — SIGNIFICANT CHANGE UP (ref 10–40)
AUTO DIFF PNL BLD: ABNORMAL
BILIRUB SERPL-MCNC: 0.6 MG/DL — SIGNIFICANT CHANGE UP (ref 0.2–1.2)
BUN SERPL-MCNC: 16 MG/DL — SIGNIFICANT CHANGE UP (ref 7–23)
C-ANCA SER-ACNC: NEGATIVE — SIGNIFICANT CHANGE UP
C3 SERPL-MCNC: 154 MG/DL — SIGNIFICANT CHANGE UP (ref 81–157)
C4 SERPL-MCNC: 38 MG/DL — SIGNIFICANT CHANGE UP (ref 13–39)
CALCIUM SERPL-MCNC: 9.1 MG/DL — SIGNIFICANT CHANGE UP (ref 8.4–10.5)
CHLORIDE SERPL-SCNC: 101 MMOL/L — SIGNIFICANT CHANGE UP (ref 96–108)
CO2 SERPL-SCNC: 24 MMOL/L — SIGNIFICANT CHANGE UP (ref 22–31)
CREAT SERPL-MCNC: 0.59 MG/DL — SIGNIFICANT CHANGE UP (ref 0.5–1.3)
DRVVT SCREEN TO CONFIRM RATIO: SIGNIFICANT CHANGE UP
DSDNA AB FLD-ACNC: <0.2 AI — SIGNIFICANT CHANGE UP
DSDNA AB FLD-ACNC: <0.2 AI — SIGNIFICANT CHANGE UP
EGFR: 108 ML/MIN/1.73M2 — SIGNIFICANT CHANGE UP
ENA SM AB FLD QL: <0.2 AI — SIGNIFICANT CHANGE UP
ENA SS-A AB FLD IA-ACNC: <0.2 AI — SIGNIFICANT CHANGE UP
ENA SS-A AB FLD IA-ACNC: <0.2 AI — SIGNIFICANT CHANGE UP
ERYTHROCYTE [SEDIMENTATION RATE] IN BLOOD: 29 MM/HR — HIGH (ref 0–20)
GLUCOSE BLDC GLUCOMTR-MCNC: 160 MG/DL — HIGH (ref 70–99)
GLUCOSE BLDC GLUCOMTR-MCNC: 176 MG/DL — HIGH (ref 70–99)
GLUCOSE BLDC GLUCOMTR-MCNC: 197 MG/DL — HIGH (ref 70–99)
GLUCOSE BLDC GLUCOMTR-MCNC: 210 MG/DL — HIGH (ref 70–99)
GLUCOSE SERPL-MCNC: 185 MG/DL — HIGH (ref 70–99)
HCT VFR BLD CALC: 36.4 % — SIGNIFICANT CHANGE UP (ref 34.5–45)
HGB BLD-MCNC: 11.5 G/DL — SIGNIFICANT CHANGE UP (ref 11.5–15.5)
IGA FLD-MCNC: 194 MG/DL — SIGNIFICANT CHANGE UP (ref 84–499)
IGG FLD-MCNC: 1151 MG/DL — SIGNIFICANT CHANGE UP (ref 610–1660)
IGM SERPL-MCNC: 36 MG/DL — SIGNIFICANT CHANGE UP (ref 35–242)
KAPPA LC SER QL IFE: 1.1 MG/DL — SIGNIFICANT CHANGE UP (ref 0.33–1.94)
KAPPA/LAMBDA FREE LIGHT CHAIN RATIO, SERUM: 0.94 RATIO — SIGNIFICANT CHANGE UP (ref 0.26–1.65)
LA NT DPL PPP QL: 29.7 SEC — SIGNIFICANT CHANGE UP
LAMBDA LC SER QL IFE: 1.17 MG/DL — SIGNIFICANT CHANGE UP (ref 0.57–2.63)
MCHC RBC-ENTMCNC: 29.5 PG — SIGNIFICANT CHANGE UP (ref 27–34)
MCHC RBC-ENTMCNC: 31.6 GM/DL — LOW (ref 32–36)
MCV RBC AUTO: 93.3 FL — SIGNIFICANT CHANGE UP (ref 80–100)
MPO AB + PR3 PNL SER: SIGNIFICANT CHANGE UP
NORMALIZED SCT PPP-RTO: 0.9 RATIO — SIGNIFICANT CHANGE UP (ref 0–1.16)
NORMALIZED SCT PPP-RTO: SIGNIFICANT CHANGE UP
NRBC # BLD: 0 /100 WBCS — SIGNIFICANT CHANGE UP (ref 0–0)
P-ANCA SER-ACNC: NEGATIVE — SIGNIFICANT CHANGE UP
PLATELET # BLD AUTO: 145 K/UL — LOW (ref 150–400)
POTASSIUM SERPL-MCNC: 3.2 MMOL/L — LOW (ref 3.5–5.3)
POTASSIUM SERPL-SCNC: 3.2 MMOL/L — LOW (ref 3.5–5.3)
PROT SERPL-MCNC: 6.8 G/DL — SIGNIFICANT CHANGE UP (ref 6–8.3)
RBC # BLD: 3.9 M/UL — SIGNIFICANT CHANGE UP (ref 3.8–5.2)
RBC # FLD: 13.1 % — SIGNIFICANT CHANGE UP (ref 10.3–14.5)
SODIUM SERPL-SCNC: 139 MMOL/L — SIGNIFICANT CHANGE UP (ref 135–145)
VIT D25+D1,25 OH+D1,25 PNL SERPL-MCNC: 91 PG/ML — HIGH (ref 19.9–79.3)
WBC # BLD: 8.18 K/UL — SIGNIFICANT CHANGE UP (ref 3.8–10.5)
WBC # FLD AUTO: 8.18 K/UL — SIGNIFICANT CHANGE UP (ref 3.8–10.5)

## 2022-05-19 PROCEDURE — 99232 SBSQ HOSP IP/OBS MODERATE 35: CPT

## 2022-05-19 RX ORDER — POTASSIUM CHLORIDE 20 MEQ
40 PACKET (EA) ORAL ONCE
Refills: 0 | Status: COMPLETED | OUTPATIENT
Start: 2022-05-19 | End: 2022-05-19

## 2022-05-19 RX ADMIN — Medication 2 UNIT(S): at 12:41

## 2022-05-19 RX ADMIN — Medication 2 UNIT(S): at 08:32

## 2022-05-19 RX ADMIN — Medication 1 MILLIGRAM(S): at 12:41

## 2022-05-19 RX ADMIN — VALSARTAN 160 MILLIGRAM(S): 80 TABLET ORAL at 05:29

## 2022-05-19 RX ADMIN — SIMVASTATIN 20 MILLIGRAM(S): 20 TABLET, FILM COATED ORAL at 22:06

## 2022-05-19 RX ADMIN — Medication 1: at 08:31

## 2022-05-19 RX ADMIN — Medication 1: at 12:41

## 2022-05-19 RX ADMIN — Medication 25 MICROGRAM(S): at 05:28

## 2022-05-19 RX ADMIN — Medication 1: at 17:32

## 2022-05-19 RX ADMIN — Medication 2 UNIT(S): at 17:32

## 2022-05-19 RX ADMIN — Medication 40 MILLIEQUIVALENT(S): at 18:07

## 2022-05-19 NOTE — PROGRESS NOTE ADULT - PROBLEM SELECTOR PLAN 1
Subacute with symptoms going on for many months. ID FU noted  Sjogren HALEY neg, rheum evaluation noted.  ENT can HALEY this condition as out patient.  DC planning for tomorrow  SW patient's daughter.
Subacute with symptoms going on for many months. ID evaluation noted  Sjogren HALEY  rheum evaluation in AM.  ENT to FU for biopsy
-F/u mumps  -Rheum for systemic w/u-possible sjogrens  -Biopsy not indicated, however, if no systemic markers/definitive dx consider US guided bx via radiology department or IR
Subacute with symptoms going on for many months. ID evaluation noted  Sjogren HALEY, rheum evaluation noted

## 2022-05-19 NOTE — PROGRESS NOTE ADULT - SUBJECTIVE AND OBJECTIVE BOX
Follow Up: Parotid swelling    Interval History/ROS:  [id number]. Afebrile. Feels better today. No diarrhea.   Onset 5 years ago.   Usually has fevers with the swelling.   Can't recall the name of the capsule her PMD gives her.     Allergies  No Known Allergies        ANTIMICROBIALS:      OTHER MEDS:  acetaminophen     Tablet .. 1000 milliGRAM(s) Oral every 6 hours PRN  benzocaine 15 mG/menthol 3.6 mG Lozenge 1 Lozenge Oral every 3 hours PRN  dextrose 5%. 1000 milliLiter(s) IV Continuous <Continuous>  dextrose 5%. 1000 milliLiter(s) IV Continuous <Continuous>  dextrose 5%. 1000 milliLiter(s) IV Continuous <Continuous>  dextrose 50% Injectable 25 Gram(s) IV Push once  dextrose 50% Injectable 12.5 Gram(s) IV Push once  dextrose 50% Injectable 25 Gram(s) IV Push once  dextrose Oral Gel 15 Gram(s) Oral once PRN  folic acid 1 milliGRAM(s) Oral daily  glucagon  Injectable 1 milliGRAM(s) IntraMuscular once  glucagon  Injectable 1 milliGRAM(s) IntraMuscular once  ibuprofen  Tablet. 600 milliGRAM(s) Oral every 6 hours PRN  insulin lispro (ADMELOG) corrective regimen sliding scale   SubCutaneous three times a day before meals  insulin lispro (ADMELOG) corrective regimen sliding scale   SubCutaneous at bedtime  insulin lispro Injectable (ADMELOG) 2 Unit(s) SubCutaneous before breakfast  insulin lispro Injectable (ADMELOG) 2 Unit(s) SubCutaneous before lunch  insulin lispro Injectable (ADMELOG) 2 Unit(s) SubCutaneous before dinner  levothyroxine 25 MICROGram(s) Oral daily  simvastatin 20 milliGRAM(s) Oral at bedtime  valsartan 160 milliGRAM(s) Oral daily      Vital Signs Last 24 Hrs  T(C): 36.8 (18 May 2022 15:44), Max: 37.1 (18 May 2022 11:49)  T(F): 98.2 (18 May 2022 15:44), Max: 98.7 (18 May 2022 11:49)  HR: 70 (18 May 2022 15:44) (70 - 79)  BP: 121/75 (18 May 2022 15:44) (121/75 - 147/88)  BP(mean): --  RR: 18 (18 May 2022 15:44) (16 - 18)  SpO2: 98% (18 May 2022 15:44) (96% - 98%)    Physical Exam:  General: non toxic  ENT: bilateral parotid swelling, nontender today   Cardio: regular rate   Respiratory: nonlabored   abd: nondistended  Skin: no rash                          10.7   12.75 )-----------( 165      ( 17 May 2022 06:14 )             33.4       05-17    140  |  103  |  22  ----------------------------<  351<H>  3.3<L>   |  21<L>  |  0.54    Ca    9.4      17 May 2022 06:14    TPro  7.0  /  Alb  4.3  /  TBili  0.5  /  DBili  x   /  AST  17  /  ALT  29  /  AlkPhos  73  05-17          MICROBIOLOGY:  Culture - Blood (collected 05-16-22 @ 16:48)  Source: .Blood Blood-Peripheral  Gram Stain (05-17-22 @ 20:17):    Growth in aerobic bottle: Gram Positive Cocci in Clusters  Preliminary Report (05-17-22 @ 20:17):    Growth in aerobic bottle: Gram Positive Cocci in Clusters    ***Blood Panel PCR results on this specimen are available    approximately 3 hours after the Gram stain result.***    Gram stain, PCR, and/or culture results may not always    correspond due to difference in methodologies.    ************************************************************    This PCR assay was performed by multiplex PCR. This    Assay tests for 66 bacterial and resistance gene targets.    Please refer to the [facility] test directory    at [email or web address] for details.  Organism: Blood Culture PCR (05-17-22 @ 22:40)  Organism: Blood Culture PCR (05-17-22 @ 22:40)      -  Coagulase negative Staphylococcus, Methicillin resistant: Detec      Method Type: PCR    Culture - Blood (collected 05-16-22 @ 16:48)  Source: .Blood Blood-Peripheral  Preliminary Report (05-17-22 @ 23:02):    No growth to date.    Culture - Urine (collected 05-15-22 @ 10:43)  Source: Clean Catch Clean Catch (Midstream)  Final Report (05-16-22 @ 12:07):    <10,000 CFU/mL Normal Urogenital Lashon    Culture - Blood (collected 05-15-22 @ 07:50)  Source: .Blood Blood-Peripheral  Preliminary Report (05-16-22 @ 15:01):    No growth to date.    Culture - Blood (collected 05-15-22 @ 07:35)  Source: .Blood Blood-Peripheral  Preliminary Report (05-16-22 @ 15:01):    No growth to date.    Rapid RVP Result: Detected (05-15 @ 12:35)      RADIOLOGY:  Images below reviewed personally  CT Neck Soft Tissue w/ IV Cont (05.15.22 @ 09:32)   Symmetric enlargement of the BILATERAL parotid glands which fatty   infiltration consistent with sialosis  
Follow Up: Parotid swelling     Interval History/ROS:  [id number]. Afebrile. Feeling better. Reports chronic dry mouth and dry eyes.     Allergies  No Known Allergies        ANTIMICROBIALS:      OTHER MEDS:  acetaminophen     Tablet .. 1000 milliGRAM(s) Oral every 6 hours PRN  benzocaine 15 mG/menthol 3.6 mG Lozenge 1 Lozenge Oral every 3 hours PRN  dextrose 5%. 1000 milliLiter(s) IV Continuous <Continuous>  dextrose 5%. 1000 milliLiter(s) IV Continuous <Continuous>  dextrose 5%. 1000 milliLiter(s) IV Continuous <Continuous>  dextrose 50% Injectable 25 Gram(s) IV Push once  dextrose 50% Injectable 12.5 Gram(s) IV Push once  dextrose 50% Injectable 25 Gram(s) IV Push once  dextrose Oral Gel 15 Gram(s) Oral once PRN  folic acid 1 milliGRAM(s) Oral daily  glucagon  Injectable 1 milliGRAM(s) IntraMuscular once  glucagon  Injectable 1 milliGRAM(s) IntraMuscular once  ibuprofen  Tablet. 600 milliGRAM(s) Oral every 6 hours PRN  insulin lispro (ADMELOG) corrective regimen sliding scale   SubCutaneous three times a day before meals  insulin lispro (ADMELOG) corrective regimen sliding scale   SubCutaneous at bedtime  insulin lispro Injectable (ADMELOG) 2 Unit(s) SubCutaneous before breakfast  insulin lispro Injectable (ADMELOG) 2 Unit(s) SubCutaneous before lunch  insulin lispro Injectable (ADMELOG) 2 Unit(s) SubCutaneous before dinner  levothyroxine 25 MICROGram(s) Oral daily  simvastatin 20 milliGRAM(s) Oral at bedtime  valsartan 160 milliGRAM(s) Oral daily      Vital Signs Last 24 Hrs  T(C): 37 (19 May 2022 11:48), Max: 37.2 (18 May 2022 20:35)  T(F): 98.6 (19 May 2022 11:48), Max: 99 (18 May 2022 20:35)  HR: 78 (19 May 2022 11:48) (61 - 78)  BP: 149/89 (19 May 2022 11:48) (116/68 - 149/89)  BP(mean): --  RR: 18 (19 May 2022 11:48) (18 - 18)  SpO2: 96% (19 May 2022 11:48) (95% - 98%)    Physical Exam:  General: non toxic  ENT: bilateral parotid swelling, stable, slightly tender on the left. poor dentition   Cardio: regular rate   Respiratory: nonlabored   abd: nondistended  Skin: no rash                          11.5   8.18  )-----------( 145      ( 19 May 2022 07:18 )             36.4       05-19    139  |  101  |  16  ----------------------------<  185<H>  3.2<L>   |  24  |  0.59    Ca    9.1      19 May 2022 07:21    TPro  6.8  /  Alb  4.1  /  TBili  0.6  /  DBili  x   /  AST  39  /  ALT  59<H>  /  AlkPhos  72  05-19          MICROBIOLOGY:  Culture - Blood (collected 05-18-22 @ 00:09)  Source: .Blood Blood  Preliminary Report (05-19-22 @ 03:01):    No growth to date.    Culture - Blood (collected 05-18-22 @ 00:09)  Source: .Blood Blood  Preliminary Report (05-19-22 @ 03:01):    No growth to date.    Culture - Blood (collected 05-16-22 @ 16:48)  Source: .Blood Blood-Peripheral  Gram Stain (05-17-22 @ 20:17):    Growth in aerobic bottle: Gram Positive Cocci in Clusters  Final Report (05-18-22 @ 18:25):    Growth in aerobic bottle: Staphylococcus hominis    Coag Negative Staphylococcus    Single set isolate, possible contaminant. Contact    Microbiology if susceptibility testing clinically    indicated.    ***Blood Panel PCR results on this specimen are available    approximately 3 hours after the Gram stain result.***    Gram stain, PCR, and/or culture results may not always    correspond due to difference in methodologies.    ************************************************************    This PCR assay was performed by multiplex PCR. This    Assay tests for 66 bacterial and resistance gene targets.    Please refer to the [facility] test directory    at [email or web address] for details.  Organism: Blood Culture PCR (05-18-22 @ 18:25)  Organism: Blood Culture PCR (05-18-22 @ 18:25)      -  Coagulase negative Staphylococcus, Methicillin resistant: Detec      Method Type: PCR    Culture - Blood (collected 05-16-22 @ 16:48)  Source: .Blood Blood-Peripheral  Preliminary Report (05-17-22 @ 23:02):    No growth to date.    Culture - Urine (collected 05-15-22 @ 10:43)  Source: Clean Catch Clean Catch (Midstream)  Final Report (05-16-22 @ 12:07):    <10,000 CFU/mL Normal Urogenital Lashon    Culture - Blood (collected 05-15-22 @ 07:50)  Source: .Blood Blood-Peripheral  Preliminary Report (05-16-22 @ 15:01):    No growth to date.    Culture - Blood (collected 05-15-22 @ 07:35)  Source: .Blood Blood-Peripheral  Preliminary Report (05-16-22 @ 15:01):    No growth to date.    Rapid RVP Result: Detected (05-15 @ 12:35)    RADIOLOGY:  Images below reviewed personally  CT Neck Soft Tissue w/ IV Cont (05.15.22 @ 09:32)   Symmetric enlargement of the BILATERAL parotid glands which fatty   infiltration consistent with sialosis  
Patient is a 52y old  Female who presents with a chief complaint of   5/18/2022    HPI:  No new symptoms. Afebrile.    PAST MEDICAL & SURGICAL HISTORY:  HTN (hypertension)      DM (diabetes mellitus)      Hypothyroidism      GERD (gastroesophageal reflux disease)      No significant past surgical history          Review of Systems:   CONSTITUTIONAL: No fever, weight loss, or fatigue  EYES: No eye pain, visual disturbances, or discharge  ENMT:  No difficulty hearing, tinnitus, vertigo; No sinus or throat pain  NECK: No pain or stiffness  BREASTS: No pain, masses, or nipple discharge  RESPIRATORY: No cough, wheezing, chills or hemoptysis; No shortness of breath  CARDIOVASCULAR: No chest pain, palpitations, dizziness, or leg swelling  GASTROINTESTINAL: No abdominal or epigastric pain. No nausea, vomiting, or hematemesis; No diarrhea or constipation. No melena or hematochezia.  GENITOURINARY: No dysuria, frequency, hematuria, or incontinence  NEUROLOGICAL: No headaches, memory loss, loss of strength, numbness, or tremors  SKIN: No itching, burning, rashes, or lesions   LYMPH NODES: No enlarged glands  ENDOCRINE: No heat or cold intolerance; No hair loss  MUSCULOSKELETAL: No joint pain or swelling; No muscle, back, or extremity pain  PSYCHIATRIC: No depression, anxiety, mood swings, or difficulty sleeping  HEME/LYMPH: No easy bruising, or bleeding gums  ALLERY AND IMMUNOLOGIC: No hives or eczema    Allergies    No Known Allergies    Intolerances        Social History:     FAMILY HISTORY:  No pertinent family history in first degree relatives        MEDICATIONS  (STANDING):  dextrose 5%. 1000 milliLiter(s) (100 mL/Hr) IV Continuous <Continuous>  dextrose 5%. 1000 milliLiter(s) (50 mL/Hr) IV Continuous <Continuous>  dextrose 5%. 1000 milliLiter(s) (100 mL/Hr) IV Continuous <Continuous>  dextrose 50% Injectable 25 Gram(s) IV Push once  dextrose 50% Injectable 12.5 Gram(s) IV Push once  dextrose 50% Injectable 25 Gram(s) IV Push once  folic acid 1 milliGRAM(s) Oral daily  glucagon  Injectable 1 milliGRAM(s) IntraMuscular once  glucagon  Injectable 1 milliGRAM(s) IntraMuscular once  insulin lispro (ADMELOG) corrective regimen sliding scale   SubCutaneous three times a day before meals  insulin lispro (ADMELOG) corrective regimen sliding scale   SubCutaneous at bedtime  insulin lispro Injectable (ADMELOG) 2 Unit(s) SubCutaneous before breakfast  insulin lispro Injectable (ADMELOG) 2 Unit(s) SubCutaneous before lunch  insulin lispro Injectable (ADMELOG) 2 Unit(s) SubCutaneous before dinner  levothyroxine 25 MICROGram(s) Oral daily  simvastatin 20 milliGRAM(s) Oral at bedtime  valsartan 160 milliGRAM(s) Oral daily    MEDICATIONS  (PRN):  acetaminophen     Tablet .. 1000 milliGRAM(s) Oral every 6 hours PRN Temp greater or equal to 38C (100.4F), Mild Pain (1 - 3)  benzocaine 15 mG/menthol 3.6 mG Lozenge 1 Lozenge Oral every 3 hours PRN Sore Throat  dextrose Oral Gel 15 Gram(s) Oral once PRN Blood Glucose LESS THAN 70 milliGRAM(s)/deciliter  ibuprofen  Tablet. 600 milliGRAM(s) Oral every 6 hours PRN Temp greater or equal to 38C (100.4F), Moderate Pain (4 - 6)        CAPILLARY BLOOD GLUCOSE      POCT Blood Glucose.: 304 mg/dL (17 May 2022 16:35)  POCT Blood Glucose.: 331 mg/dL (17 May 2022 12:31)  POCT Blood Glucose.: 306 mg/dL (17 May 2022 08:59)  POCT Blood Glucose.: 321 mg/dL (16 May 2022 21:57)    I&O's Summary      PHYSICAL EXAM:  Vital Signs Last 24 Hrs  T(C): 36.7 (17 May 2022 20:39), Max: 36.9 (16 May 2022 23:38)  T(F): 98 (17 May 2022 20:39), Max: 98.5 (17 May 2022 09:04)  HR: 79 (17 May 2022 20:39) (79 - 100)  BP: 131/79 (17 May 2022 20:39) (128/80 - 148/85)  BP(mean): --  RR: 17 (17 May 2022 20:39) (16 - 17)  SpO2: 97% (17 May 2022 20:39) (95% - 98%)    GENERAL: NAD, well-developed  HEAD:  Atraumatic, Normocephalic  EYES: EOMI, PERRLA, conjunctiva and sclera clear  NECK: Supple, No JVD  CHEST/LUNG: Clear to auscultation bilaterally; No wheeze  HEART: Regular rate and rhythm; No murmurs, rubs, or gallops  ABDOMEN: Soft, Nontender, Nondistended; Bowel sounds present  EXTREMITIES:  2+ Peripheral Pulses, No clubbing, cyanosis, or edema  PSYCH: AAOx3  NEUROLOGY: non-focal  SKIN: No rashes or lesions    LABS:                        10.7   12.75 )-----------( 165      ( 17 May 2022 06:14 )             33.4     05-17    140  |  103  |  22  ----------------------------<  351<H>  3.3<L>   |  21<L>  |  0.54    Ca    9.4      17 May 2022 06:14    TPro  7.0  /  Alb  4.3  /  TBili  0.5  /  DBili  x   /  AST  17  /  ALT  29  /  AlkPhos  73  05-17              RADIOLOGY & ADDITIONAL TESTS:    Imaging Personally Reviewed:    Consultant(s) Notes Reviewed:      Care Discussed with Consultants/Other Providers:  
ENT ISSUE/POD: b/l parotid swelling    HPI: 3 yo F pmhx htn dm hypothyroid p/w 3 months of b/l facial swelling. Started 3 months ago no inciting incident or trauma. intermittent worsening swelling and pain. seen by pcp given course of amoxacillin for 1 month with no improvement. Pt is able to tolerate po intake without any dysphagia, odynophagia. intermittent fevers with fatigue. no weight loss or night sweats. no sick contacts. Denies CP sob abd pain n/v dysuria.      PAST MEDICAL & SURGICAL HISTORY:  HTN (hypertension)      DM (diabetes mellitus)      Hypothyroidism      GERD (gastroesophageal reflux disease)      No significant past surgical history        Allergies    No Known Allergies    Intolerances      MEDICATIONS  (STANDING):  dextrose 5%. 1000 milliLiter(s) (100 mL/Hr) IV Continuous <Continuous>  dextrose 5%. 1000 milliLiter(s) (50 mL/Hr) IV Continuous <Continuous>  dextrose 5%. 1000 milliLiter(s) (100 mL/Hr) IV Continuous <Continuous>  dextrose 50% Injectable 25 Gram(s) IV Push once  dextrose 50% Injectable 12.5 Gram(s) IV Push once  dextrose 50% Injectable 25 Gram(s) IV Push once  folic acid 1 milliGRAM(s) Oral daily  glucagon  Injectable 1 milliGRAM(s) IntraMuscular once  glucagon  Injectable 1 milliGRAM(s) IntraMuscular once  insulin lispro (ADMELOG) corrective regimen sliding scale   SubCutaneous three times a day before meals  insulin lispro (ADMELOG) corrective regimen sliding scale   SubCutaneous at bedtime  insulin lispro Injectable (ADMELOG) 2 Unit(s) SubCutaneous before breakfast  insulin lispro Injectable (ADMELOG) 2 Unit(s) SubCutaneous before lunch  insulin lispro Injectable (ADMELOG) 2 Unit(s) SubCutaneous before dinner  levothyroxine 25 MICROGram(s) Oral daily  simvastatin 20 milliGRAM(s) Oral at bedtime  valsartan 160 milliGRAM(s) Oral daily    MEDICATIONS  (PRN):  acetaminophen     Tablet .. 1000 milliGRAM(s) Oral every 6 hours PRN Temp greater or equal to 38C (100.4F), Mild Pain (1 - 3)  benzocaine 15 mG/menthol 3.6 mG Lozenge 1 Lozenge Oral every 3 hours PRN Sore Throat  dextrose Oral Gel 15 Gram(s) Oral once PRN Blood Glucose LESS THAN 70 milliGRAM(s)/deciliter  ibuprofen  Tablet. 600 milliGRAM(s) Oral every 6 hours PRN Temp greater or equal to 38C (100.4F), Moderate Pain (4 - 6)      ROS:   ENT: all negative except as noted in HPI   Pulm: denies SOB, cough, hemoptysis  Neuro: denies numbness/tingling, loss of sensation  Endo: denies heat/cold intolerance, excessive sweating      Vital Signs Last 24 Hrs  T(C): 36.6 (17 May 2022 23:24), Max: 36.9 (17 May 2022 09:04)  T(F): 97.8 (17 May 2022 23:24), Max: 98.5 (17 May 2022 09:04)  HR: 75 (17 May 2022 23:24) (75 - 95)  BP: 147/88 (17 May 2022 23:24) (131/79 - 148/85)  BP(mean): --  RR: 16 (17 May 2022 23:24) (16 - 17)  SpO2: 98% (17 May 2022 23:24) (95% - 98%)                          10.7   12.75 )-----------( 165      ( 17 May 2022 06:14 )             33.4    05-17    140  |  103  |  22  ----------------------------<  351<H>  3.3<L>   |  21<L>  |  0.54    Ca    9.4      17 May 2022 06:14    TPro  7.0  /  Alb  4.3  /  TBili  0.5  /  DBili  x   /  AST  17  /  ALT  29  /  AlkPhos  73  05-17       PHYSICAL EXAM:  Gen: NAD  Skin: No rashes, bruises, or lesions  Head: Normocephalic, Atraumatic  Face: no edema, erythema, or fluctuance. Firm b/l parotid swelling  Eyes: no scleral injection  Nose: Nares bilaterally patent, no discharge  Mouth: No Stridor / Drooling / Trismus.  Mucosa moist, tongue/uvula midline, oropharynx clear  Neck: Flat, supple, no lymphadenopathy, trachea midline, no masses  Lymphatic: No lymphadenopathy  Resp: breathing easily, no stridor  Neuro: facial nerve intact, no facial droop        
Patient is a 52y old  Female who presents with a chief complaint of   5/19/2022    HPI:  No new symptoms. Afebrile. Non tender parotis gland swelling pepper    PAST MEDICAL & SURGICAL HISTORY:  HTN (hypertension)      DM (diabetes mellitus)      Hypothyroidism      GERD (gastroesophageal reflux disease)      No significant past surgical history          Review of Systems:   CONSTITUTIONAL: No fever, weight loss, or fatigue  EYES: No eye pain, visual disturbances, or discharge  ENMT:  No difficulty hearing, tinnitus, vertigo; No sinus or throat pain  NECK: No pain or stiffness  BREASTS: No pain, masses, or nipple discharge  RESPIRATORY: No cough, wheezing, chills or hemoptysis; No shortness of breath  CARDIOVASCULAR: No chest pain, palpitations, dizziness, or leg swelling  GASTROINTESTINAL: No abdominal or epigastric pain. No nausea, vomiting, or hematemesis; No diarrhea or constipation. No melena or hematochezia.  GENITOURINARY: No dysuria, frequency, hematuria, or incontinence  NEUROLOGICAL: No headaches, memory loss, loss of strength, numbness, or tremors  SKIN: No itching, burning, rashes, or lesions   LYMPH NODES: No enlarged glands  ENDOCRINE: No heat or cold intolerance; No hair loss  MUSCULOSKELETAL: No joint pain or swelling; No muscle, back, or extremity pain  PSYCHIATRIC: No depression, anxiety, mood swings, or difficulty sleeping  HEME/LYMPH: No easy bruising, or bleeding gums  ALLERY AND IMMUNOLOGIC: No hives or eczema    Allergies    No Known Allergies    Intolerances        Social History:     FAMILY HISTORY:  No pertinent family history in first degree relatives        MEDICATIONS  (STANDING):  dextrose 5%. 1000 milliLiter(s) (100 mL/Hr) IV Continuous <Continuous>  dextrose 5%. 1000 milliLiter(s) (50 mL/Hr) IV Continuous <Continuous>  dextrose 5%. 1000 milliLiter(s) (100 mL/Hr) IV Continuous <Continuous>  dextrose 50% Injectable 25 Gram(s) IV Push once  dextrose 50% Injectable 12.5 Gram(s) IV Push once  dextrose 50% Injectable 25 Gram(s) IV Push once  folic acid 1 milliGRAM(s) Oral daily  glucagon  Injectable 1 milliGRAM(s) IntraMuscular once  glucagon  Injectable 1 milliGRAM(s) IntraMuscular once  insulin lispro (ADMELOG) corrective regimen sliding scale   SubCutaneous three times a day before meals  insulin lispro (ADMELOG) corrective regimen sliding scale   SubCutaneous at bedtime  insulin lispro Injectable (ADMELOG) 2 Unit(s) SubCutaneous before breakfast  insulin lispro Injectable (ADMELOG) 2 Unit(s) SubCutaneous before lunch  insulin lispro Injectable (ADMELOG) 2 Unit(s) SubCutaneous before dinner  levothyroxine 25 MICROGram(s) Oral daily  simvastatin 20 milliGRAM(s) Oral at bedtime  valsartan 160 milliGRAM(s) Oral daily    MEDICATIONS  (PRN):  acetaminophen     Tablet .. 1000 milliGRAM(s) Oral every 6 hours PRN Temp greater or equal to 38C (100.4F), Mild Pain (1 - 3)  benzocaine 15 mG/menthol 3.6 mG Lozenge 1 Lozenge Oral every 3 hours PRN Sore Throat  dextrose Oral Gel 15 Gram(s) Oral once PRN Blood Glucose LESS THAN 70 milliGRAM(s)/deciliter  ibuprofen  Tablet. 600 milliGRAM(s) Oral every 6 hours PRN Temp greater or equal to 38C (100.4F), Moderate Pain (4 - 6)        CAPILLARY BLOOD GLUCOSE      POCT Blood Glucose.: 304 mg/dL (17 May 2022 16:35)  POCT Blood Glucose.: 331 mg/dL (17 May 2022 12:31)  POCT Blood Glucose.: 306 mg/dL (17 May 2022 08:59)  POCT Blood Glucose.: 321 mg/dL (16 May 2022 21:57)    I&O's Summary      PHYSICAL EXAM:  Vital Signs Last 24 Hrs  T(C): 36.7 (17 May 2022 20:39), Max: 36.9 (16 May 2022 23:38)  T(F): 98 (17 May 2022 20:39), Max: 98.5 (17 May 2022 09:04)  HR: 79 (17 May 2022 20:39) (79 - 100)  BP: 131/79 (17 May 2022 20:39) (128/80 - 148/85)  BP(mean): --  RR: 17 (17 May 2022 20:39) (16 - 17)  SpO2: 97% (17 May 2022 20:39) (95% - 98%)    GENERAL: NAD, well-developed  HEAD:  Atraumatic, Normocephalic  EYES: EOMI, PERRLA, conjunctiva and sclera clear  NECK: Supple, No JVD  CHEST/LUNG: Clear to auscultation bilaterally; No wheeze  HEART: Regular rate and rhythm; No murmurs, rubs, or gallops  ABDOMEN: Soft, Nontender, Nondistended; Bowel sounds present  EXTREMITIES:  2+ Peripheral Pulses, No clubbing, cyanosis, or edema  PSYCH: AAOx3  NEUROLOGY: non-focal  SKIN: No rashes or lesions    LABS:                        10.7   12.75 )-----------( 165      ( 17 May 2022 06:14 )             33.4     05-17    140  |  103  |  22  ----------------------------<  351<H>  3.3<L>   |  21<L>  |  0.54    Ca    9.4      17 May 2022 06:14    TPro  7.0  /  Alb  4.3  /  TBili  0.5  /  DBili  x   /  AST  17  /  ALT  29  /  AlkPhos  73  05-17              RADIOLOGY & ADDITIONAL TESTS:    Imaging Personally Reviewed:    Consultant(s) Notes Reviewed:      Care Discussed with Consultants/Other Providers:  
Patient is a 52y old  Female who presents with a chief complaint of     HPI:  No new symptoms. Afebrile.    PAST MEDICAL & SURGICAL HISTORY:  HTN (hypertension)      DM (diabetes mellitus)      Hypothyroidism      GERD (gastroesophageal reflux disease)      No significant past surgical history          Review of Systems:   CONSTITUTIONAL: No fever, weight loss, or fatigue  EYES: No eye pain, visual disturbances, or discharge  ENMT:  No difficulty hearing, tinnitus, vertigo; No sinus or throat pain  NECK: No pain or stiffness  BREASTS: No pain, masses, or nipple discharge  RESPIRATORY: No cough, wheezing, chills or hemoptysis; No shortness of breath  CARDIOVASCULAR: No chest pain, palpitations, dizziness, or leg swelling  GASTROINTESTINAL: No abdominal or epigastric pain. No nausea, vomiting, or hematemesis; No diarrhea or constipation. No melena or hematochezia.  GENITOURINARY: No dysuria, frequency, hematuria, or incontinence  NEUROLOGICAL: No headaches, memory loss, loss of strength, numbness, or tremors  SKIN: No itching, burning, rashes, or lesions   LYMPH NODES: No enlarged glands  ENDOCRINE: No heat or cold intolerance; No hair loss  MUSCULOSKELETAL: No joint pain or swelling; No muscle, back, or extremity pain  PSYCHIATRIC: No depression, anxiety, mood swings, or difficulty sleeping  HEME/LYMPH: No easy bruising, or bleeding gums  ALLERY AND IMMUNOLOGIC: No hives or eczema    Allergies    No Known Allergies    Intolerances        Social History:     FAMILY HISTORY:  No pertinent family history in first degree relatives        MEDICATIONS  (STANDING):  dextrose 5%. 1000 milliLiter(s) (100 mL/Hr) IV Continuous <Continuous>  dextrose 5%. 1000 milliLiter(s) (50 mL/Hr) IV Continuous <Continuous>  dextrose 5%. 1000 milliLiter(s) (100 mL/Hr) IV Continuous <Continuous>  dextrose 50% Injectable 25 Gram(s) IV Push once  dextrose 50% Injectable 12.5 Gram(s) IV Push once  dextrose 50% Injectable 25 Gram(s) IV Push once  folic acid 1 milliGRAM(s) Oral daily  glucagon  Injectable 1 milliGRAM(s) IntraMuscular once  glucagon  Injectable 1 milliGRAM(s) IntraMuscular once  insulin lispro (ADMELOG) corrective regimen sliding scale   SubCutaneous three times a day before meals  insulin lispro (ADMELOG) corrective regimen sliding scale   SubCutaneous at bedtime  insulin lispro Injectable (ADMELOG) 2 Unit(s) SubCutaneous before breakfast  insulin lispro Injectable (ADMELOG) 2 Unit(s) SubCutaneous before lunch  insulin lispro Injectable (ADMELOG) 2 Unit(s) SubCutaneous before dinner  levothyroxine 25 MICROGram(s) Oral daily  simvastatin 20 milliGRAM(s) Oral at bedtime  valsartan 160 milliGRAM(s) Oral daily    MEDICATIONS  (PRN):  acetaminophen     Tablet .. 1000 milliGRAM(s) Oral every 6 hours PRN Temp greater or equal to 38C (100.4F), Mild Pain (1 - 3)  benzocaine 15 mG/menthol 3.6 mG Lozenge 1 Lozenge Oral every 3 hours PRN Sore Throat  dextrose Oral Gel 15 Gram(s) Oral once PRN Blood Glucose LESS THAN 70 milliGRAM(s)/deciliter  ibuprofen  Tablet. 600 milliGRAM(s) Oral every 6 hours PRN Temp greater or equal to 38C (100.4F), Moderate Pain (4 - 6)        CAPILLARY BLOOD GLUCOSE      POCT Blood Glucose.: 304 mg/dL (17 May 2022 16:35)  POCT Blood Glucose.: 331 mg/dL (17 May 2022 12:31)  POCT Blood Glucose.: 306 mg/dL (17 May 2022 08:59)  POCT Blood Glucose.: 321 mg/dL (16 May 2022 21:57)    I&O's Summary      PHYSICAL EXAM:  Vital Signs Last 24 Hrs  T(C): 36.7 (17 May 2022 20:39), Max: 36.9 (16 May 2022 23:38)  T(F): 98 (17 May 2022 20:39), Max: 98.5 (17 May 2022 09:04)  HR: 79 (17 May 2022 20:39) (79 - 100)  BP: 131/79 (17 May 2022 20:39) (128/80 - 148/85)  BP(mean): --  RR: 17 (17 May 2022 20:39) (16 - 17)  SpO2: 97% (17 May 2022 20:39) (95% - 98%)    GENERAL: NAD, well-developed  HEAD:  Atraumatic, Normocephalic  EYES: EOMI, PERRLA, conjunctiva and sclera clear  NECK: Supple, No JVD  CHEST/LUNG: Clear to auscultation bilaterally; No wheeze  HEART: Regular rate and rhythm; No murmurs, rubs, or gallops  ABDOMEN: Soft, Nontender, Nondistended; Bowel sounds present  EXTREMITIES:  2+ Peripheral Pulses, No clubbing, cyanosis, or edema  PSYCH: AAOx3  NEUROLOGY: non-focal  SKIN: No rashes or lesions    LABS:                        10.7   12.75 )-----------( 165      ( 17 May 2022 06:14 )             33.4     05-17    140  |  103  |  22  ----------------------------<  351<H>  3.3<L>   |  21<L>  |  0.54    Ca    9.4      17 May 2022 06:14    TPro  7.0  /  Alb  4.3  /  TBili  0.5  /  DBili  x   /  AST  17  /  ALT  29  /  AlkPhos  73  05-17              RADIOLOGY & ADDITIONAL TESTS:    Imaging Personally Reviewed:    Consultant(s) Notes Reviewed:      Care Discussed with Consultants/Other Providers:

## 2022-05-19 NOTE — PROGRESS NOTE ADULT - ASSESSMENT
Five years of recurring bilateral parotid gland swelling, pain and fevers.   Current swelling ongoing since Feb.   Sialosis (DM risk factor) and Sjogren's syndrome are top differentials.   Doubt infectious including bacterial or Mumps.   Feels better today. s/p three days of Decadron.     Suggest  -monitor off antibiotics  -maintain droplet isolation for now, f/u Mumps PCR   -f/u Rheumatology workup     Jez Winston MD   Infectious Disease   Available on TEAMS. After 5PM and on weekends please page fellow on call or call [phone]

## 2022-05-20 VITALS
HEART RATE: 86 BPM | RESPIRATION RATE: 18 BRPM | OXYGEN SATURATION: 97 % | SYSTOLIC BLOOD PRESSURE: 118 MMHG | DIASTOLIC BLOOD PRESSURE: 78 MMHG | TEMPERATURE: 99 F

## 2022-05-20 LAB
ACE SERPL-CCNC: 20 U/L — SIGNIFICANT CHANGE UP (ref 14–82)
CULTURE RESULTS: SIGNIFICANT CHANGE UP
CULTURE RESULTS: SIGNIFICANT CHANGE UP
DSDNA AB SER-ACNC: 12 IU/ML — SIGNIFICANT CHANGE UP
GLUCOSE BLDC GLUCOMTR-MCNC: 172 MG/DL — HIGH (ref 70–99)
GLUCOSE BLDC GLUCOMTR-MCNC: 217 MG/DL — HIGH (ref 70–99)
SPECIMEN SOURCE: SIGNIFICANT CHANGE UP
SPECIMEN SOURCE: SIGNIFICANT CHANGE UP

## 2022-05-20 PROCEDURE — 87798 DETECT AGENT NOS DNA AMP: CPT

## 2022-05-20 PROCEDURE — 85014 HEMATOCRIT: CPT

## 2022-05-20 PROCEDURE — 82565 ASSAY OF CREATININE: CPT

## 2022-05-20 PROCEDURE — 96375 TX/PRO/DX INJ NEW DRUG ADDON: CPT

## 2022-05-20 PROCEDURE — 87640 STAPH A DNA AMP PROBE: CPT

## 2022-05-20 PROCEDURE — 86036 ANCA SCREEN EACH ANTIBODY: CPT

## 2022-05-20 PROCEDURE — 85652 RBC SED RATE AUTOMATED: CPT

## 2022-05-20 PROCEDURE — U0003: CPT

## 2022-05-20 PROCEDURE — 80053 COMPREHEN METABOLIC PANEL: CPT

## 2022-05-20 PROCEDURE — 83605 ASSAY OF LACTIC ACID: CPT

## 2022-05-20 PROCEDURE — 96374 THER/PROPH/DIAG INJ IV PUSH: CPT

## 2022-05-20 PROCEDURE — 82787 IGG 1 2 3 OR 4 EACH: CPT

## 2022-05-20 PROCEDURE — 85027 COMPLETE CBC AUTOMATED: CPT

## 2022-05-20 PROCEDURE — 93005 ELECTROCARDIOGRAM TRACING: CPT

## 2022-05-20 PROCEDURE — 82164 ANGIOTENSIN I ENZYME TEST: CPT

## 2022-05-20 PROCEDURE — 36415 COLL VENOUS BLD VENIPUNCTURE: CPT

## 2022-05-20 PROCEDURE — G0378: CPT

## 2022-05-20 PROCEDURE — 82962 GLUCOSE BLOOD TEST: CPT

## 2022-05-20 PROCEDURE — 82947 ASSAY GLUCOSE BLOOD QUANT: CPT

## 2022-05-20 PROCEDURE — 82306 VITAMIN D 25 HYDROXY: CPT

## 2022-05-20 PROCEDURE — 87641 MR-STAPH DNA AMP PROBE: CPT

## 2022-05-20 PROCEDURE — 85730 THROMBOPLASTIN TIME PARTIAL: CPT

## 2022-05-20 PROCEDURE — 85610 PROTHROMBIN TIME: CPT

## 2022-05-20 PROCEDURE — 85018 HEMOGLOBIN: CPT

## 2022-05-20 PROCEDURE — 82330 ASSAY OF CALCIUM: CPT

## 2022-05-20 PROCEDURE — 82652 VIT D 1 25-DIHYDROXY: CPT

## 2022-05-20 PROCEDURE — U0005: CPT

## 2022-05-20 PROCEDURE — 81001 URINALYSIS AUTO W/SCOPE: CPT

## 2022-05-20 PROCEDURE — 82435 ASSAY OF BLOOD CHLORIDE: CPT

## 2022-05-20 PROCEDURE — 84295 ASSAY OF SERUM SODIUM: CPT

## 2022-05-20 PROCEDURE — 71045 X-RAY EXAM CHEST 1 VIEW: CPT

## 2022-05-20 PROCEDURE — 82784 ASSAY IGA/IGD/IGG/IGM EACH: CPT

## 2022-05-20 PROCEDURE — 99291 CRITICAL CARE FIRST HOUR: CPT | Mod: 25

## 2022-05-20 PROCEDURE — 87086 URINE CULTURE/COLONY COUNT: CPT

## 2022-05-20 PROCEDURE — 86735 MUMPS ANTIBODY: CPT

## 2022-05-20 PROCEDURE — 86160 COMPLEMENT ANTIGEN: CPT

## 2022-05-20 PROCEDURE — 87150 DNA/RNA AMPLIFIED PROBE: CPT

## 2022-05-20 PROCEDURE — 86140 C-REACTIVE PROTEIN: CPT

## 2022-05-20 PROCEDURE — 86225 DNA ANTIBODY NATIVE: CPT

## 2022-05-20 PROCEDURE — 84132 ASSAY OF SERUM POTASSIUM: CPT

## 2022-05-20 PROCEDURE — 85025 COMPLETE CBC W/AUTO DIFF WBC: CPT

## 2022-05-20 PROCEDURE — 82803 BLOOD GASES ANY COMBINATION: CPT

## 2022-05-20 PROCEDURE — 83036 HEMOGLOBIN GLYCOSYLATED A1C: CPT

## 2022-05-20 PROCEDURE — 70491 CT SOFT TISSUE NECK W/DYE: CPT | Mod: MA

## 2022-05-20 PROCEDURE — 84145 PROCALCITONIN (PCT): CPT

## 2022-05-20 PROCEDURE — 86235 NUCLEAR ANTIGEN ANTIBODY: CPT

## 2022-05-20 PROCEDURE — 87040 BLOOD CULTURE FOR BACTERIA: CPT

## 2022-05-20 PROCEDURE — 87077 CULTURE AEROBIC IDENTIFY: CPT

## 2022-05-20 PROCEDURE — 96376 TX/PRO/DX INJ SAME DRUG ADON: CPT

## 2022-05-20 PROCEDURE — 0225U NFCT DS DNA&RNA 21 SARSCOV2: CPT

## 2022-05-20 RX ORDER — VALSARTAN 80 MG/1
1 TABLET ORAL
Qty: 0 | Refills: 0 | DISCHARGE
Start: 2022-05-20

## 2022-05-20 RX ORDER — VALSARTAN 80 MG/1
1 TABLET ORAL
Qty: 0 | Refills: 0 | DISCHARGE

## 2022-05-20 RX ORDER — FAMOTIDINE 10 MG/ML
1 INJECTION INTRAVENOUS
Qty: 0 | Refills: 0 | DISCHARGE

## 2022-05-20 RX ORDER — LEVOTHYROXINE SODIUM 125 MCG
1 TABLET ORAL
Qty: 0 | Refills: 0 | DISCHARGE
Start: 2022-05-20

## 2022-05-20 RX ORDER — REPAGLINIDE 1 MG/1
1 TABLET ORAL
Qty: 0 | Refills: 0 | DISCHARGE

## 2022-05-20 RX ORDER — SIMVASTATIN 20 MG/1
1 TABLET, FILM COATED ORAL
Qty: 0 | Refills: 0 | DISCHARGE
Start: 2022-05-20

## 2022-05-20 RX ORDER — LORATADINE 10 MG/1
1 TABLET ORAL
Qty: 0 | Refills: 0 | DISCHARGE

## 2022-05-20 RX ORDER — SITAGLIPTIN AND METFORMIN HYDROCHLORIDE 500; 50 MG/1; MG/1
1 TABLET, FILM COATED ORAL
Qty: 0 | Refills: 0 | DISCHARGE

## 2022-05-20 RX ORDER — BENZOCAINE AND MENTHOL 5; 1 G/100ML; G/100ML
1 LIQUID ORAL
Qty: 0 | Refills: 0 | DISCHARGE
Start: 2022-05-20

## 2022-05-20 RX ORDER — LEVOTHYROXINE SODIUM 125 MCG
1 TABLET ORAL
Qty: 0 | Refills: 0 | DISCHARGE

## 2022-05-20 RX ORDER — ACETAMINOPHEN 500 MG
2 TABLET ORAL
Qty: 0 | Refills: 0 | DISCHARGE
Start: 2022-05-20

## 2022-05-20 RX ORDER — SIMVASTATIN 20 MG/1
1 TABLET, FILM COATED ORAL
Qty: 0 | Refills: 0 | DISCHARGE

## 2022-05-20 RX ORDER — FOLIC ACID 0.8 MG
1 TABLET ORAL
Qty: 0 | Refills: 0 | DISCHARGE
Start: 2022-05-20

## 2022-05-20 RX ORDER — FOLIC ACID 0.8 MG
1 TABLET ORAL
Qty: 0 | Refills: 0 | DISCHARGE

## 2022-05-20 RX ADMIN — Medication 1: at 12:41

## 2022-05-20 RX ADMIN — Medication 25 MICROGRAM(S): at 05:29

## 2022-05-20 RX ADMIN — Medication 1 MILLIGRAM(S): at 11:13

## 2022-05-20 RX ADMIN — Medication 2 UNIT(S): at 12:41

## 2022-05-20 RX ADMIN — VALSARTAN 160 MILLIGRAM(S): 80 TABLET ORAL at 05:29

## 2022-05-20 RX ADMIN — Medication 2: at 08:41

## 2022-05-20 RX ADMIN — Medication 2 UNIT(S): at 08:42

## 2022-05-20 NOTE — DISCHARGE NOTE PROVIDER - CARE PROVIDERS DIRECT ADDRESSES
[email or web address]

## 2022-05-20 NOTE — DISCHARGE NOTE PROVIDER - NSDCMRMEDTOKEN_GEN_ALL_CORE_FT
famotidine 20 mg oral tablet: 1 tab(s) orally once a day  folic acid 1 mg oral tablet: 1 tab(s) orally once a day  glipiZIDE 5 mg oral tablet: 1 tab(s) orally 2 times a day  Janumet 50 mg-1000 mg oral tablet: 1 tab(s) orally 2 times a day  levothyroxine 25 mcg (0.025 mg) oral tablet: 1 tab(s) orally once a day  loratadine 10 mg oral tablet: 1 tab(s) orally once a day  repaglinide 1 mg oral tablet: 1 tab(s) orally 3 times a day (before meals)  simvastatin 20 mg oral tablet: 1 tab(s) orally once a day (at bedtime)  valsartan 160 mg oral tablet: 1 tab(s) orally once a day   acetaminophen 500 mg oral tablet: 2 tab(s) orally every 6 hours, As needed, Temp greater or equal to 38C (100.4F), Mild Pain (1 - 3)  famotidine 20 mg oral tablet: 1 tab(s) orally once a day  folic acid 1 mg oral tablet: 1 tab(s) orally once a day  glipiZIDE 5 mg oral tablet: 1 tab(s) orally 2 times a day  continue home medications as prescribed  Janumet 50 mg-1000 mg oral tablet: 1 tab(s) orally 2 times a day  continue home medications as prescribed  levothyroxine 25 mcg (0.025 mg) oral tablet: 1 tab(s) orally once a day  loratadine 10 mg oral tablet: 1 tab(s) orally once a day  continue home medications as prescribed  menthol-benzocaine 3.6 mg-15 mg mucous membrane lozenge: 1 dose(s) mucous membrane 3 times a day, As Needed  repaglinide 1 mg oral tablet: 1 tab(s) orally 3 times a day (before meals)  continue home medications as prescribed  simvastatin 20 mg oral tablet: 1 tab(s) orally once a day (at bedtime)  valsartan 160 mg oral tablet: 1 tab(s) orally once a day

## 2022-05-20 NOTE — DISCHARGE NOTE PROVIDER - NSDCCPCAREPLAN_GEN_ALL_CORE_FT
PRINCIPAL DISCHARGE DIAGNOSIS  Diagnosis: Parotid swelling  Assessment and Plan of Treatment: due to acute parotitis  followup ENT outpt. for biopsy      SECONDARY DISCHARGE DIAGNOSES  Diagnosis: DM2 (diabetes mellitus, type 2)  Assessment and Plan of Treatment: HgA1C this admission.  Make sure you get your HgA1c checked every three months.  If you take oral diabetes medications, check your blood glucose two times a day.  If you take insulin, check your blood glucose before meals and at bedtime.  It's important not to skip any meals.  Keep a log of your blood glucose results and always take it with you to your doctor appointments.  Keep a list of your current medications including injectables and over the counter medications and bring this medication list with you to all your doctor appointments.  If you have not seen your ophthalmologist this year call for appointment.  Check your feet daily for redness, sores, or openings. Do not self treat. If no improvement in two days call your primary care physician for an appointment.  Low blood sugar (hypoglycemia) is a blood sugar below 70mg/dl. Check your blood sugar if you feel signs/symptoms of hypoglycemia. If your blood sugar is below 70 take 15 grams of carbohydrates (ex 4 oz of apple juice, 3-4 glucose tablets, or 4-6 oz of regular soda) wait 15 minutes and repeat blood sugar to make sure it comes up above 70.  If your blood sugar is above 70 and you are due for a meal, have a meal.  If you are not due for a meal have a snack.  This snack helps keeps your blood sugar at a safe range.

## 2022-05-20 NOTE — DISCHARGE NOTE PROVIDER - CARE PROVIDER_API CALL
David Francisco)  Internal Medicine  [address] [place] [address]  Phone: [phone]  Fax: [phone]  Follow Up Time: 2 weeks   David Francisco (MD)  Internal Medicine  [address] [place] [address]  Phone: [phone]  Fax: [phone]  Follow Up Time: 2 weeks    ruby abel  endocrine Dr. Bhumi Tejada/colleagues: [phone]  Phone: (   )    -  Fax: (   )    -  Follow Up Time: 2 weeks

## 2022-05-20 NOTE — DISCHARGE NOTE PROVIDER - HOSPITAL COURSE
Problem/Plan - 1:  ·  Problem: Acute parotitis.   ·  Plan: Subacute with symptoms going on for many months. ID FU noted  Sjogren HALEY neg, rheum evaluation noted.  ENT can HALEY this condition as out patient.  DC planning for tomorrow  SW patient's daughter.     Problem/Plan - 2:  ·  Problem: DM (diabetes mellitus).   ·  Plan: Needs put patient Endocrine FU.     Problem/Plan - 3:  ·  Problem: Benign essential HTN.   ·  Plan: FU on current medications.     Problem/Plan - 4:  ·  Problem: Hypothyroidism.   ·  Plan: Cont home dose of Levothyroxine.

## 2022-05-20 NOTE — DISCHARGE NOTE PROVIDER - PROVIDER TOKENS
PROVIDER:[TOKEN:[[id number]],FOLLOWUP:[2 weeks]] PROVIDER:[TOKEN:[[id number]],FOLLOWUP:[2 weeks]],FREE:[LAST:[abel],FIRST:[ruby],PHONE:[(   )    -],FAX:[(   )    -],ADDRESS:[endocrine Dr. Bhumi Tejada/colleagues: [phone]],FOLLOWUP:[2 weeks]]

## 2022-05-20 NOTE — DISCHARGE NOTE NURSING/CASE MANAGEMENT/SOCIAL WORK - NSDCPEFALRISK_GEN_ALL_CORE
For information on Fall & Injury Prevention, visit: [email or web address] OR  [email or web address] OR  https://www.cdc.gov/steadi/patient.html

## 2022-05-20 NOTE — DISCHARGE NOTE NURSING/CASE MANAGEMENT/SOCIAL WORK - PATIENT PORTAL LINK FT
You can access the FollowMyHealth Patient Portal offered by [facility] by registering at the following website: [email or web address]. By joining [facility]’s FollowMyHealth portal, you will also be able to view your health information using other applications (apps) compatible with our system.

## 2022-05-21 LAB
CULTURE RESULTS: SIGNIFICANT CHANGE UP
IGG SERPL-MCNC: 1194 MG/DL — SIGNIFICANT CHANGE UP (ref 586–1602)
IGG1 SER-MCNC: 698 MG/DL — SIGNIFICANT CHANGE UP (ref 248–810)
IGG2 SER-MCNC: 322 MG/DL — SIGNIFICANT CHANGE UP (ref 130–555)
IGG3 SER-MCNC: 57 MG/DL — SIGNIFICANT CHANGE UP (ref 15–102)
IGG4 SER-MCNC: 35 MG/DL — SIGNIFICANT CHANGE UP (ref 2–96)
MUMPS VIRUS RNA, QUALITATIVE REAL-TIME PCR, RESULT: SIGNIFICANT CHANGE UP
SPECIMEN SOURCE: SIGNIFICANT CHANGE UP

## 2022-05-23 LAB
CULTURE RESULTS: SIGNIFICANT CHANGE UP
CULTURE RESULTS: SIGNIFICANT CHANGE UP
SPECIMEN SOURCE: SIGNIFICANT CHANGE UP
SPECIMEN SOURCE: SIGNIFICANT CHANGE UP

## 2024-06-29 NOTE — ED ADULT NURSE NOTE - THROAT
You were evaluated for syncope (fainting or almost fainting).  Your workup revealed hyponatremia (decreased sodium) and anemia (decreased hemoglobin). You were treated with IV fluids.    You have a referral to Cardiology.  They should call you to establish care, but if you do not hear from them within 3-5 days, you may call the number provided.    Follow up with your PCP within 3-5 days as needed.    Return to the emergency department for any changes in mental status, slurring of speech, new weakness, loss of consciousness, chest pain, shortness of breath, any other new or concerning symptoms.  
asymptomatic
